# Patient Record
Sex: FEMALE | Race: WHITE | NOT HISPANIC OR LATINO | Employment: FULL TIME
[De-identification: names, ages, dates, MRNs, and addresses within clinical notes are randomized per-mention and may not be internally consistent; named-entity substitution may affect disease eponyms.]

---

## 2017-11-21 ENCOUNTER — RECORDS - HEALTHEAST (OUTPATIENT)
Dept: ADMINISTRATIVE | Facility: OTHER | Age: 52
End: 2017-11-21

## 2018-01-15 ENCOUNTER — RECORDS - HEALTHEAST (OUTPATIENT)
Dept: ADMINISTRATIVE | Facility: OTHER | Age: 53
End: 2018-01-15

## 2018-02-02 ENCOUNTER — TRANSFERRED RECORDS (OUTPATIENT)
Dept: HEALTH INFORMATION MANAGEMENT | Facility: CLINIC | Age: 53
End: 2018-02-02
Payer: COMMERCIAL

## 2018-02-08 ENCOUNTER — RECORDS - HEALTHEAST (OUTPATIENT)
Dept: BONE DENSITY | Facility: CLINIC | Age: 53
End: 2018-02-08

## 2018-02-08 ENCOUNTER — RECORDS - HEALTHEAST (OUTPATIENT)
Dept: ADMINISTRATIVE | Facility: OTHER | Age: 53
End: 2018-02-08

## 2018-02-08 DIAGNOSIS — C50.919 MALIGNANT NEOPLASM OF UNSPECIFIED SITE OF UNSPECIFIED FEMALE BREAST (H): ICD-10-CM

## 2018-02-08 DIAGNOSIS — Z79.811 LONG TERM (CURRENT) USE OF AROMATASE INHIBITORS: ICD-10-CM

## 2018-02-15 ENCOUNTER — TRANSFERRED RECORDS (OUTPATIENT)
Dept: HEALTH INFORMATION MANAGEMENT | Facility: CLINIC | Age: 53
End: 2018-02-15
Payer: COMMERCIAL

## 2018-03-06 ENCOUNTER — OFFICE VISIT - HEALTHEAST (OUTPATIENT)
Dept: FAMILY MEDICINE | Facility: CLINIC | Age: 53
End: 2018-03-06

## 2018-03-06 DIAGNOSIS — M85.80 OSTEOPENIA: ICD-10-CM

## 2018-03-06 DIAGNOSIS — Z91.030 BEE STING ALLERGY: ICD-10-CM

## 2018-03-06 DIAGNOSIS — C50.919 MALIGNANT NEOPLASM OF FEMALE BREAST (H): ICD-10-CM

## 2018-03-06 RX ORDER — LACTULOSE 10 G/15ML
SOLUTION ORAL; RECTAL
Refills: 0 | Status: SHIPPED | COMMUNITY
Start: 2018-02-09 | End: 2022-05-09

## 2018-03-06 ASSESSMENT — MIFFLIN-ST. JEOR: SCORE: 1317.77

## 2018-08-07 ENCOUNTER — COMMUNICATION - HEALTHEAST (OUTPATIENT)
Dept: FAMILY MEDICINE | Facility: CLINIC | Age: 53
End: 2018-08-07

## 2019-05-29 ENCOUNTER — COMMUNICATION - HEALTHEAST (OUTPATIENT)
Dept: FAMILY MEDICINE | Facility: CLINIC | Age: 54
End: 2019-05-29

## 2019-05-30 ENCOUNTER — AMBULATORY - HEALTHEAST (OUTPATIENT)
Dept: NURSING | Facility: CLINIC | Age: 54
End: 2019-05-30

## 2019-09-12 ENCOUNTER — OFFICE VISIT - HEALTHEAST (OUTPATIENT)
Dept: FAMILY MEDICINE | Facility: CLINIC | Age: 54
End: 2019-09-12

## 2019-09-12 DIAGNOSIS — C50.919 MALIGNANT NEOPLASM OF FEMALE BREAST, UNSPECIFIED ESTROGEN RECEPTOR STATUS, UNSPECIFIED LATERALITY, UNSPECIFIED SITE OF BREAST (H): ICD-10-CM

## 2019-09-12 DIAGNOSIS — Z23 IMMUNIZATION DUE: ICD-10-CM

## 2019-09-12 DIAGNOSIS — Z91.030 BEE STING ALLERGY: ICD-10-CM

## 2019-09-12 DIAGNOSIS — M85.80 OSTEOPENIA, UNSPECIFIED LOCATION: ICD-10-CM

## 2019-09-12 RX ORDER — BETAMETHASONE DIPROPIONATE 0.5 MG/G
LOTION TOPICAL 2 TIMES DAILY
Status: SHIPPED | COMMUNITY
Start: 2019-09-12 | End: 2022-05-09

## 2019-09-12 RX ORDER — EPINEPHRINE 0.3 MG/.3ML
0.3 INJECTION SUBCUTANEOUS
Qty: 1 | Refills: 6 | Status: SHIPPED | OUTPATIENT
Start: 2019-09-12 | End: 2023-01-26

## 2019-09-12 RX ORDER — RALOXIFENE HYDROCHLORIDE 60 MG/1
60 TABLET, FILM COATED ORAL DAILY
Status: SHIPPED | COMMUNITY
Start: 2019-09-12 | End: 2022-01-17

## 2019-09-12 RX ORDER — KETOCONAZOLE 20 MG/ML
SHAMPOO TOPICAL
Status: SHIPPED | COMMUNITY
Start: 2019-09-12 | End: 2022-05-09

## 2019-09-12 ASSESSMENT — MIFFLIN-ST. JEOR: SCORE: 1331.37

## 2019-09-23 ENCOUNTER — COMMUNICATION - HEALTHEAST (OUTPATIENT)
Dept: SCHEDULING | Facility: CLINIC | Age: 54
End: 2019-09-23

## 2019-09-25 ENCOUNTER — OFFICE VISIT - HEALTHEAST (OUTPATIENT)
Dept: FAMILY MEDICINE | Facility: CLINIC | Age: 54
End: 2019-09-25

## 2019-09-25 DIAGNOSIS — R10.31 RIGHT LOWER QUADRANT ABDOMINAL PAIN: ICD-10-CM

## 2020-01-21 ENCOUNTER — TELEPHONE (OUTPATIENT)
Dept: FAMILY MEDICINE | Facility: CLINIC | Age: 55
End: 2020-01-21

## 2020-01-21 NOTE — TELEPHONE ENCOUNTER
Reason for Call:  Other appointment    Detailed comments: Pt called requesting Dr Altman be her PCP. Advised pt Dr Altman has closed practice. Pt was referred by her Oncologist Dr Stevens. Pt states she does not want to come in for an appointment she just wants to add Dr Altman to her chart as her PCP.     Phone Number Patient can be reached at: Home number on file [phone] (home)    Best Time: Anytime     Can we leave a detailed message on this number? YES    Call taken on 1/21/2020 at 10:23 AM by Nick Ivey

## 2021-01-18 ENCOUNTER — TRANSFERRED RECORDS (OUTPATIENT)
Dept: HEALTH INFORMATION MANAGEMENT | Facility: CLINIC | Age: 56
End: 2021-01-18
Payer: COMMERCIAL

## 2021-01-18 ENCOUNTER — MEDICAL CORRESPONDENCE (OUTPATIENT)
Dept: HEALTH INFORMATION MANAGEMENT | Facility: CLINIC | Age: 56
End: 2021-01-18

## 2021-05-25 ENCOUNTER — RECORDS - HEALTHEAST (OUTPATIENT)
Dept: ADMINISTRATIVE | Facility: CLINIC | Age: 56
End: 2021-05-25

## 2021-05-26 ENCOUNTER — RECORDS - HEALTHEAST (OUTPATIENT)
Dept: ADMINISTRATIVE | Facility: CLINIC | Age: 56
End: 2021-05-26

## 2021-05-28 ENCOUNTER — RECORDS - HEALTHEAST (OUTPATIENT)
Dept: ADMINISTRATIVE | Facility: CLINIC | Age: 56
End: 2021-05-28

## 2021-05-29 NOTE — TELEPHONE ENCOUNTER
New Appointment Needed  What is the reason for the visit:    1 of 2 shingrix vaccine  Provider Preference: Any available  How soon do you need to be seen?: when available  Waitlist offered?: No  Okay to leave a detailed message:  Yes

## 2021-05-30 ENCOUNTER — RECORDS - HEALTHEAST (OUTPATIENT)
Dept: ADMINISTRATIVE | Facility: CLINIC | Age: 56
End: 2021-05-30

## 2021-05-31 VITALS — WEIGHT: 155 LBS | HEIGHT: 67 IN | BODY MASS INDEX: 24.33 KG/M2

## 2021-06-01 NOTE — TELEPHONE ENCOUNTER
New Appointment Needed  What is the reason for the visit:    Inpatient/ED Follow Up Appt Request  At what hospital or facility were you seen?: N/A   What is the reason you were seen?: Possible appendicitis    What date were you admitted?: date: 9/22/19  What date were you discharged?:   9/22/19  What was the recommended timeframe for your follow up appointment?: 2 days   Provider Preference: PCP only  How soon do you need to be seen?: tomorrow  Waitlist offered?: No  Okay to leave a detailed message:  Yes    Please call patient as soon as able.

## 2021-06-01 NOTE — TELEPHONE ENCOUNTER
Left detailed message for pt relaying MD message below.  Please assist pt with scheduling an EDF appt on Wednesday with Dr. Salgado (as Dr. Ryan is out of the office on Wednesdays).

## 2021-06-01 NOTE — PROGRESS NOTES
Chief Complaint   Patient presents with     Follow-up     Appendisitis         HPI:   Rosalinda Garcia is a 53 y.o. female in for follow up of ER visit three days ago.  Was seen for abdominal pain.  Pain has significantly improved. No fever.  No nausea. No vomiting. No diarrhea.  No blood in stools.  No dysuria.  No hematuria.  No medication taken.    ROS:  A 10 point comprehensive review of systems was negative except as noted.      Medications:  Current Outpatient Medications on File Prior to Visit   Medication Sig Dispense Refill     betamethasone dipropionate 0.05 % lotion Apply topically 2 (two) times a day.       CALCIUM CARBONATE (CALCIUM 500 ORAL) Take by mouth.       cholecalciferol, vitamin D3, (VITAMIN D3) 1,000 unit capsule Take 1,000 Units by mouth daily.       clobetasol (TEMOVATE) 0.05 % external solution APPLY TO SCALP LESIONS ONE TO TWO TIMES D PRN  2     EPINEPHrine (EPIPEN) 0.3 mg/0.3 mL injection Inject 0.3 mL (0.3 mg total) into the shoulder, thigh, or buttocks once as needed (anaphylaxis). 1 Pre-filled Pen Syringe 6     GENERLAC 10 gram/15 mL solution TK 15 ML PO D PRN  0     ketoconazole (NIZORAL) 2 % shampoo Apply topically 2 (two) times a week. Apply to damp skin, lather, leave on 5 minutes, and rinse       ondansetron (ZOFRAN) 4 MG tablet Take 1-2 tab po 4 times daily prn nausea, max daily dose 4 tab 20 tablet 0     raloxifene (EVISTA) 60 mg tablet Take 60 mg by mouth daily.       No current facility-administered medications on file prior to visit.          Social History:  Social History     Tobacco Use     Smoking status: Never Smoker     Smokeless tobacco: Never Used   Substance Use Topics     Alcohol use: Not on file         Physical Exam:   Vitals:    09/25/19 1506   BP: 100/70   Pulse: 64   Resp: 12   Temp: 98  F (36.7  C)   TempSrc: Oral   Weight: 158 lb (71.7 kg)     GENERAL:   Alert. Oriented.  EYES: Clear  HENT:  Ears: R TM pearly gray. Normal landmarks. L TM pearly gray.   Normal landmarks  Nose: Clear.  Sinuses: Nontender.  Oropharynx:  No erythema. No exudate.  NECK: Supple. No adenopathy.  LUNGS: Clear to ascultation.  No crackles.  No wheezing  HEART: RRR  SKIN:  No rash.      CHART REVIEW  DATE/place of visit: 9/22/2019, [facility]  DX: abnormal computed tomography of ureter, nausea  TESTS: CT abdomen--mild prominence of collecting system of right kidney and right ureter, no stone, normal appendix. Moderate stool in colon  TREATMENT: zofran      Assessment/Plan:    1. Right lower quadrant abdominal pain        Resolved at this time.  Did have mild abnormality of right ureter but no stone on CT.  Discussed she may have passed a stone, but no stones noted now, no further treatment needed.  Did have moderate amount of stool in colon and this may have caused pain.  Recommended starting on metamucil daily.  May continue the miralax as needed.    Advised continuing to observe. If abdominal pain recurs, needs reevaluation.          Zaira Hinds MD      9/25/2019    The following portions of the patient's history were reviewed and updated as appropriate: allergies, current medications, past family history, past medical history, past social history, past surgical history and problem list.

## 2021-06-01 NOTE — TELEPHONE ENCOUNTER
See message below.  Ok to wait to be seen until Wednesday with Dr. Salgado?  No openings with either you or Dr. Salgado tomorrow.

## 2021-06-01 NOTE — TELEPHONE ENCOUNTER
I reviewed her CT from the ER and there was not any appendicitis seen.  There was possibly a kidney stone that might have passed.  I think she can wait until Wednesday for follow up.  If having severe pain, fever, vomiting, then should be re-eval at the ED.

## 2021-06-01 NOTE — PROGRESS NOTES
Assessment/ Plan     1. Malignant neoplasm of female breast, unspecified estrogen receptor status, unspecified laterality, unspecified site of breast (H)  Patient had bilateral mastectomy 17 years ago.  She follows with oncology.  They wanted her to start Evista this last spring.  We discussed this is a good idea as she has some osteopenia and might be helpful.  She has gained a little weight with it so has some concerns.  She is really good about diet and exercise.  Urged her to stay on it for now.    2. Bee sting allergy  Patient needs a refill of her EpiPen.  - EPINEPHrine (EPIPEN) 0.3 mg/0.3 mL injection; Inject 0.3 mL (0.3 mg total) into the shoulder, thigh, or buttocks once as needed (anaphylaxis).  Dispense: 1 Pre-filled Pen Syringe; Refill: 6    3. Immunization due  Shingles vaccine #2 was given today.  She will get her flu shot at work.    4. Osteopenia, unspecified location  Patient will be due for another bone density next spring.  She is now taking Evista.  She is good about calcium, vitamin D, and exercise.      Subjective:       Rosalinda Garcia is a 53 y.o. female who presents for several issues.  The first of which is a refill on her EpiPen.  She has an allergy to bees.  She has a history of breast cancer and had a mastectomy about 17 years ago.  She follows with [facility] and they recommended that she start EVISta as she only did 5 years of tamoxifen originally.  She thinks she is gained about 6 pounds since the spring.  She has been exercising regularly and watching what she is eating.  She is little bit frustrated by this.  She did have some osteopenia on her last bone density, likely from having a complete hysterectomy and decrease in estrogen.  As she is otherwise so healthy, I strongly recommend that she try to stay on the Evista for bone health.  She had some other minor questions about her scalp and some shampoo and steroid that her dermatologist prescribed.  She did get her first Shingrix  "vaccine and felt ill for a couple of days.  She is wondering if she should get the second booster.  I did recommend that she get that today.    Relevant past medical, family, surgical, and social history reviewed with patient, unless noted in HPI, not pertinent for this visit.  Medications were discussed and reconciled.   Review of Systems   A 12 point comprehensive review of systems was negative except as noted.      Current Outpatient Medications   Medication Sig Dispense Refill     betamethasone dipropionate 0.05 % lotion Apply topically 2 (two) times a day.       CALCIUM CARBONATE (CALCIUM 500 ORAL) Take by mouth.       cholecalciferol, vitamin D3, (VITAMIN D3) 1,000 unit capsule Take 1,000 Units by mouth daily.       EPINEPHrine (EPIPEN) 0.3 mg/0.3 mL injection Inject 0.3 mL (0.3 mg total) into the shoulder, thigh, or buttocks once as needed (anaphylaxis). 1 Pre-filled Pen Syringe 6     GENERLAC 10 gram/15 mL solution TK 15 ML PO D PRN  0     ketoconazole (NIZORAL) 2 % shampoo Apply topically 2 (two) times a week. Apply to damp skin, lather, leave on 5 minutes, and rinse       raloxifene (EVISTA) 60 mg tablet Take 60 mg by mouth daily.       clobetasol (TEMOVATE) 0.05 % external solution APPLY TO SCALP LESIONS ONE TO TWO TIMES D PRN  2     No current facility-administered medications for this visit.        Objective:      /58   Pulse 66   Temp 97.8  F (36.6  C) (Oral)   Resp 12   Ht 5' 6.5\" (1.689 m)   Wt 158 lb (71.7 kg)   BMI 25.12 kg/m        General appearance: alert, appears stated age and cooperative      No results found for this or any previous visit (from the past 168 hour(s)).       This note has been dictated using voice recognition software. Any grammatical or context distortions are unintentional and inherent to the software  "

## 2021-06-03 VITALS
HEART RATE: 66 BPM | TEMPERATURE: 97.8 F | HEIGHT: 67 IN | DIASTOLIC BLOOD PRESSURE: 58 MMHG | WEIGHT: 158 LBS | RESPIRATION RATE: 12 BRPM | BODY MASS INDEX: 24.8 KG/M2 | SYSTOLIC BLOOD PRESSURE: 102 MMHG

## 2021-06-03 VITALS
HEART RATE: 64 BPM | WEIGHT: 158 LBS | RESPIRATION RATE: 12 BRPM | DIASTOLIC BLOOD PRESSURE: 70 MMHG | BODY MASS INDEX: 25.12 KG/M2 | SYSTOLIC BLOOD PRESSURE: 100 MMHG | TEMPERATURE: 98 F

## 2021-06-15 ENCOUNTER — TRANSCRIBE ORDERS (OUTPATIENT)
Dept: OTHER | Age: 56
End: 2021-06-15

## 2021-06-15 DIAGNOSIS — Z85.3 PERSONAL HISTORY OF MALIGNANT NEOPLASM OF BREAST: Primary | ICD-10-CM

## 2021-06-16 NOTE — PROGRESS NOTES
Assessment/ Plan     1. Bee sting allergy  Patient needs a new epinephrine pen as hers  about 4 years ago.  I also show she is due for tetanus booster so that was given today.  - EPINEPHrine (EPIPEN) 0.3 mg/0.3 mL injection; Inject 0.3 mL (0.3 mg total) into the shoulder, thigh, or buttocks once as needed (anaphylaxis).  Dispense: 1 Pre-filled Pen Syringe; Refill: 6  - Tdap vaccine greater than or equal to 8yo IM    2. Malignant neoplasm of female breast  Patient follows regularly with oncology.  She had breast cancer at age 36.  She recently had genetic testing which was negative.  She has had a bilateral mastectomy and complete hysterectomy.    3. Osteopenia  I reviewed patient's most recent bone density.  She did lose about 20% of her bone mass after her hysterectomy.  We discussed the estrogen sensitive bone mass and that is likely why it declined so much.  Would not recommend starting medications at this point.  We did discuss getting adequate calcium, vitamin D, and weightbearing exercise.  Would recheck in 2 years.      Subjective:       Rosalinda Garcia is a 52 y.o. female who presents for follow-up.  I have not seen this patient now in 5 years.  She was diagnosed with breast cancer at age 36.  She had bilateral mastectomy and complete hysterectomy.  She follows with oncology on a regular basis and has yearly blood work.  She sees a gynecologist for her breast exams.  She just wanted to touch base and see if there is anything else she should be doing.  We reviewed her most recent bone density which show some osteopenia but no fracture risk.  We discussed getting adequate calcium, vitamin D, and weightbearing exercise.  Would not start medication at this point.  She does have a strong family history of osteoporosis.  We discussed the lack of estrogen after her hysterectomy and treatment for breast cancer is a likely culprit.  She does need a new EpiPen as hers  about 4 years ago.  She uses this  "for bee sting allergy.  Review the chart shows that she is due for tetanus.  She is up-to-date on colonoscopy.    Relevant past medical, family, surgical, and social history reviewed with patient, unless noted in HPI, not pertinent for this visit.    Review of Systems   A 12 point comprehensive review of systems was negative except as noted.      Current Outpatient Prescriptions   Medication Sig Dispense Refill     CALCIUM CARBONATE (CALCIUM 500 ORAL) Take by mouth.       cholecalciferol, vitamin D3, (VITAMIN D3) 1,000 unit capsule Take 1,000 Units by mouth daily.       clobetasol (TEMOVATE) 0.05 % external solution APPLY TO SCALP LESIONS ONE TO TWO TIMES D PRN  2     EPINEPHrine (EPIPEN) 0.3 mg/0.3 mL injection Inject 0.3 mL (0.3 mg total) into the shoulder, thigh, or buttocks once as needed (anaphylaxis). 1 Pre-filled Pen Syringe 6     GENERLAC 10 gram/15 mL solution TK 15 ML PO D PRN  0     No current facility-administered medications for this visit.        Objective:      /60  Pulse 64  Temp 97.9  F (36.6  C) (Oral)   Resp 16  Ht 5' 6.5\" (1.689 m)  Wt 155 lb (70.3 kg)  BMI 24.64 kg/m2      General appearance: alert, appears stated age and cooperative    Lungs: clear to auscultation bilaterally  Heart: regular rate and rhythm, S1, S2 normal, no murmur, click, rub or gallop      No results found for this or any previous visit (from the past 168 hour(s)).       This note has been dictated using voice recognition software. Any grammatical or context distortions are unintentional and inherent to the software  "

## 2021-06-24 ENCOUNTER — PRE VISIT (OUTPATIENT)
Dept: OTHER | Age: 56
End: 2021-06-24

## 2021-06-24 NOTE — TELEPHONE ENCOUNTER
Action 06/24/2021   Action Taken REQ SENT TO [facility] PER PT SHE WILL SIGNED DARCY.    CK

## 2021-10-21 NOTE — PROGRESS NOTES
RECORDS STATUS - ALL OTHER DIAGNOSIS      RECORDS RECEIVED FROM: [facility]/ [facility] / [facility]   DATE RECEIVED: 1/17/2022   NOTES STATUS DETAILS   OFFICE NOTE from referring provider     OFFICE NOTE from medical oncologist Complete Pt saw Dr. Stevens at [facility]     [facility] Records are in EPIC   DISCHARGE SUMMARY from hospital     DISCHARGE REPORT from the ER     OPERATIVE REPORT     MEDICATION LIST Complete [facility]   CLINICAL TRIAL TREATMENTS TO DATE     LABS     PATHOLOGY REPORTS Complete- BX Report- [facility]  Jan 18th 2002 [facility] Dr. Narayan    Pt actually had her biopsy done on Jan 23rd 2002   ANYTHING RELATED TO DIAGNOSIS     GENONOMIC TESTING     TYPE:     IMAGING (NEED IMAGES & REPORT)     CT SCANS     MRI     MAMMO Complete Historical Path Report in Epic/CE   Dx Hip/ Pelvis/Spine  Complete  11/2/2021   ULTRASOUND     PET       Action    Action Taken 10/21/2021 3:09PM KEB   I called pt Rosalinda - her phone went to .     12/8/2021 9:44AM KEB   I called pt Rosalinda- her phone went to . I left a      1/5/2021 3:31pm KEB   I called [facility] [phone]  - they need an DARCY.     I called pt Rosalinda - her phone went to .     1/13/2022 11AM KEB   I called pt Rosalinda again - her phone went to  twice. I left her a  requesting a call back. She doesn't have an email address on file- I can't send her a written message.     I faxed the DARCY form to her work fax: [phone]- She has been going to [facility] for the last 20 years.     She will get the DARCY form back to me within the next few hours (before 2pm)     Tristin Sy doesn't have many breast images to collect.     I called [facility]'s Medical Records Dept [phone]  - I explained that we need to request the pt's paper chart now and that I'll be sending the DARCY form over soon.     1:33pm EMIL   Kerrie sent back her DARCY form for [facility].     I faxed the DARCY and request form over to [facility]    1/14/2022 7:23AM KEB   I faxed [facility] records to [facility]    1/14/2022 11:11AM EMIL   I faxed [facility] records to [facility] again.     1/14/2022 11:15AM EMIL   I called [facility] Path Dept     [facility] (Phone): [phone] (Fax): [phone]- they are going to work on tracking the pt's bx report. They weren't able to find the report- it only goes back to 2013.     I called [facility] Medical Records Dept to see if they have any reports from the early 2000s.     I called pt Rosalinda - she actually had her breast biopsy done at the [facility] back on Jan 18th 2002 by Dr. Melgar     I called [facility]'s historical records dept - Ph: [phone] #[phone], #[phone]  -they took down my info and records request.     1/14/2022 12:11PM EMIL Morales from [facility] called me back- she was able to locate some records from Jan 18th 2002 but the pt actually had her biopsy done on Jan 23 2002.     I received the historical path reports and faxed them off to HIM

## 2021-11-02 ENCOUNTER — ANCILLARY PROCEDURE (OUTPATIENT)
Dept: BONE DENSITY | Facility: CLINIC | Age: 56
End: 2021-11-02
Attending: INTERNAL MEDICINE
Payer: COMMERCIAL

## 2021-11-02 DIAGNOSIS — C50.911 MALIGNANT NEOPLASM OF RIGHT BREAST (H): ICD-10-CM

## 2021-11-02 PROCEDURE — 77080 DXA BONE DENSITY AXIAL: CPT | Mod: TC | Performed by: RADIOLOGY

## 2022-01-17 ENCOUNTER — ONCOLOGY VISIT (OUTPATIENT)
Dept: ONCOLOGY | Facility: CLINIC | Age: 57
End: 2022-01-17
Attending: INTERNAL MEDICINE
Payer: COMMERCIAL

## 2022-01-17 ENCOUNTER — PRE VISIT (OUTPATIENT)
Dept: ONCOLOGY | Facility: CLINIC | Age: 57
End: 2022-01-17

## 2022-01-17 VITALS
HEIGHT: 66 IN | OXYGEN SATURATION: 99 % | TEMPERATURE: 98.3 F | RESPIRATION RATE: 16 BRPM | HEART RATE: 74 BPM | BODY MASS INDEX: 23.5 KG/M2 | DIASTOLIC BLOOD PRESSURE: 67 MMHG | SYSTOLIC BLOOD PRESSURE: 101 MMHG | WEIGHT: 146.2 LBS

## 2022-01-17 DIAGNOSIS — Z91.030 BEE STING ALLERGY: ICD-10-CM

## 2022-01-17 DIAGNOSIS — Z85.3 PERSONAL HISTORY OF MALIGNANT NEOPLASM OF BREAST: Primary | ICD-10-CM

## 2022-01-17 LAB
ALBUMIN SERPL-MCNC: 4 G/DL (ref 3.4–5)
ALP SERPL-CCNC: 61 U/L (ref 40–150)
ALT SERPL W P-5'-P-CCNC: 23 U/L (ref 0–50)
ANION GAP SERPL CALCULATED.3IONS-SCNC: 3 MMOL/L (ref 3–14)
AST SERPL W P-5'-P-CCNC: 15 U/L (ref 0–45)
BASOPHILS # BLD AUTO: 0 10E3/UL (ref 0–0.2)
BASOPHILS NFR BLD AUTO: 1 %
BILIRUB SERPL-MCNC: 0.3 MG/DL (ref 0.2–1.3)
BUN SERPL-MCNC: 18 MG/DL (ref 7–30)
CALCIUM SERPL-MCNC: 9.5 MG/DL (ref 8.5–10.1)
CANCER AG27-29 SERPL-ACNC: 18 U/ML (ref 0–39)
CEA SERPL-MCNC: <0.5 UG/L (ref 0–2.5)
CHLORIDE BLD-SCNC: 104 MMOL/L (ref 94–109)
CO2 SERPL-SCNC: 30 MMOL/L (ref 20–32)
CREAT SERPL-MCNC: 0.73 MG/DL (ref 0.52–1.04)
EOSINOPHIL # BLD AUTO: 0.1 10E3/UL (ref 0–0.7)
EOSINOPHIL NFR BLD AUTO: 2 %
ERYTHROCYTE [DISTWIDTH] IN BLOOD BY AUTOMATED COUNT: 13.2 % (ref 10–15)
GFR SERPL CREATININE-BSD FRML MDRD: >90 ML/MIN/1.73M2
GLUCOSE BLD-MCNC: 119 MG/DL (ref 70–99)
HCT VFR BLD AUTO: 41.5 % (ref 35–47)
HGB BLD-MCNC: 13.5 G/DL (ref 11.7–15.7)
IMM GRANULOCYTES # BLD: 0 10E3/UL
IMM GRANULOCYTES NFR BLD: 0 %
LYMPHOCYTES # BLD AUTO: 2.2 10E3/UL (ref 0.8–5.3)
LYMPHOCYTES NFR BLD AUTO: 32 %
MCH RBC QN AUTO: 30.8 PG (ref 26.5–33)
MCHC RBC AUTO-ENTMCNC: 32.5 G/DL (ref 31.5–36.5)
MCV RBC AUTO: 95 FL (ref 78–100)
MONOCYTES # BLD AUTO: 0.3 10E3/UL (ref 0–1.3)
MONOCYTES NFR BLD AUTO: 4 %
NEUTROPHILS # BLD AUTO: 4.2 10E3/UL (ref 1.6–8.3)
NEUTROPHILS NFR BLD AUTO: 61 %
NRBC # BLD AUTO: 0 10E3/UL
NRBC BLD AUTO-RTO: 0 /100
PLATELET # BLD AUTO: 225 10E3/UL (ref 150–450)
POTASSIUM BLD-SCNC: 3.6 MMOL/L (ref 3.4–5.3)
PROT SERPL-MCNC: 7.7 G/DL (ref 6.8–8.8)
RBC # BLD AUTO: 4.39 10E6/UL (ref 3.8–5.2)
SODIUM SERPL-SCNC: 137 MMOL/L (ref 133–144)
WBC # BLD AUTO: 6.8 10E3/UL (ref 4–11)

## 2022-01-17 PROCEDURE — 86300 IMMUNOASSAY TUMOR CA 15-3: CPT | Performed by: INTERNAL MEDICINE

## 2022-01-17 PROCEDURE — 99205 OFFICE O/P NEW HI 60 MIN: CPT | Performed by: INTERNAL MEDICINE

## 2022-01-17 PROCEDURE — G0463 HOSPITAL OUTPT CLINIC VISIT: HCPCS

## 2022-01-17 PROCEDURE — 80053 COMPREHEN METABOLIC PANEL: CPT | Performed by: INTERNAL MEDICINE

## 2022-01-17 PROCEDURE — 36415 COLL VENOUS BLD VENIPUNCTURE: CPT | Performed by: INTERNAL MEDICINE

## 2022-01-17 PROCEDURE — 82306 VITAMIN D 25 HYDROXY: CPT | Performed by: INTERNAL MEDICINE

## 2022-01-17 PROCEDURE — 82378 CARCINOEMBRYONIC ANTIGEN: CPT | Performed by: INTERNAL MEDICINE

## 2022-01-17 PROCEDURE — 85025 COMPLETE CBC W/AUTO DIFF WBC: CPT | Performed by: INTERNAL MEDICINE

## 2022-01-17 RX ORDER — METAXALONE 800 MG/1
800 TABLET ORAL 2 TIMES DAILY
Qty: 60 TABLET | Refills: 1 | Status: SHIPPED | OUTPATIENT
Start: 2022-01-17 | End: 2022-05-09

## 2022-01-17 RX ORDER — LACTULOSE 10 G/15ML
SOLUTION ORAL; RECTAL
Refills: 0 | Status: CANCELLED | OUTPATIENT
Start: 2022-01-17

## 2022-01-17 RX ORDER — LACTULOSE 10 G/15ML
20 SOLUTION ORAL 3 TIMES DAILY
Qty: 480 ML | Refills: 1 | Status: SHIPPED | OUTPATIENT
Start: 2022-01-17 | End: 2022-05-09

## 2022-01-17 RX ORDER — PREDNISONE 10 MG/1
10 TABLET ORAL DAILY
Qty: 5 TABLET | Refills: 0 | Status: SHIPPED | OUTPATIENT
Start: 2022-01-17 | End: 2022-05-09

## 2022-01-17 ASSESSMENT — MIFFLIN-ST. JEOR: SCORE: 1264.91

## 2022-01-17 ASSESSMENT — PAIN SCALES - GENERAL: PAINLEVEL: NO PAIN (0)

## 2022-01-17 NOTE — PROGRESS NOTES
"Oncology Rooming Note    January 17, 2022 2:50 PM   Rosalinda Garcia is a 56 year old female who presents for:    Chief Complaint   Patient presents with     Oncology Clinic Visit     New Patient     Initial Vitals: /67   Pulse 74   Temp 98.3  F (36.8  C) (Oral)   Resp 16   Ht 1.676 m (5' 6\")   Wt 66.3 kg (146 lb 3.2 oz)   SpO2 99%   BMI 23.60 kg/m   Estimated body mass index is 23.6 kg/m  as calculated from the following:    Height as of this encounter: 1.676 m (5' 6\").    Weight as of this encounter: 66.3 kg (146 lb 3.2 oz). Body surface area is 1.76 meters squared.  No Pain (0) Comment: Data Unavailable   No LMP recorded.  Allergies reviewed: Yes  Medications reviewed: Yes    Medications: MEDICATION REFILLS NEEDED TODAY. Provider was notified.  Pharmacy name entered into [employer]: [id number] - [place], [place] - [facility] [address] AT [facility] [address]    Clinical concerns: Refills needed for Epi-Pen and Generlac.       Joseline Yang MA              "

## 2022-01-17 NOTE — LETTER
"    1/17/2022         RE: Rosalinda Garcia  [address] [facility] [address]        Dear Colleague,    Thank you for referring your patient, Rosalinda Garcia, to the [facility]. Please see a copy of my visit note below.    Oncology Rooming Note    January 17, 2022 2:50 PM   Rosalinda Garcia is a 56 year old female who presents for:    Chief Complaint   Patient presents with     Oncology Clinic Visit     New Patient     Initial Vitals: /67   Pulse 74   Temp 98.3  F (36.8  C) (Oral)   Resp 16   Ht 1.676 m (5' 6\")   Wt 66.3 kg (146 lb 3.2 oz)   SpO2 99%   BMI 23.60 kg/m   Estimated body mass index is 23.6 kg/m  as calculated from the following:    Height as of this encounter: 1.676 m (5' 6\").    Weight as of this encounter: 66.3 kg (146 lb 3.2 oz). Body surface area is 1.76 meters squared.  No Pain (0) Comment: Data Unavailable   No LMP recorded.  Allergies reviewed: Yes  Medications reviewed: Yes    Medications: MEDICATION REFILLS NEEDED TODAY. Provider was notified.  Pharmacy name entered into [facility]: [facility] #[id number] - [address] [place] AT [facility] [address]    Clinical concerns: Refills needed for Epi-Pen and Generlac.       Joseline Yang MA                [facility]    Hematology/Oncology New Patient Note      Today's Date: 01/17/22    Reason for Consult: breast cancer reestablish care        HISTORY OF PRESENT ILLNESS:     Oncology treatment Summary:    1.  February 2002 right breast mastectomy T1 cN0 M0 infiltrating ductal carcinoma ER/CT positive  2.  Left prophylactic mastectomy negative  3.  Adriamycin Cytoxan completed in May 2002  4.  Tamoxifen for 5 years completing in May 2007  5.  Status post CIRO/BSO    Interval history: Rosalinda returns today for follow-up.  She is clinically doing well.  Bone density scan was reviewed which shows mild osteopenia with a T score of -2.6.          REVIEW OF " SYSTEMS:   14 point ROS was reviewed and is negative other than as noted above in HPI.       HOME MEDICATIONS:  Current Outpatient Medications   Medication Sig Dispense Refill     CALCIUM CARBONATE (CALCIUM 500 ORAL) [CALCIUM CARBONATE (CALCIUM 500 ORAL)] Take by mouth.       cholecalciferol, vitamin D3, (VITAMIN D3) 1,000 unit capsule [CHOLECALCIFEROL, VITAMIN D3, (VITAMIN D3) 1,000 UNIT CAPSULE] Take 1,000 Units by mouth daily.       EPINEPHrine (EPIPEN) 0.3 mg/0.3 mL injection [EPINEPHRINE (EPIPEN) 0.3 MG/0.3 ML INJECTION] Inject 0.3 mL (0.3 mg total) into the shoulder, thigh, or buttocks once as needed (anaphylaxis). 1 6     GENERLAC 10 gram/15 mL solution [GENERLAC 10 GRAM/15 ML SOLUTION] TK 15 ML PO D PRN  0     lactulose (CHRONULAC) 10 GM/15ML solution Take 30 mLs (20 g) by mouth 3 times daily 480 mL 1     metaxalone (SKELAXIN) 800 MG tablet Take 1 tablet (800 mg) by mouth 2 times daily 60 tablet 1     predniSONE (DELTASONE) 10 MG tablet Take 1 tablet (10 mg) by mouth daily 5 tablet 0     betamethasone dipropionate 0.05 % lotion [BETAMETHASONE DIPROPIONATE 0.05 % LOTION] Apply topically 2 (two) times a day.       clobetasol (TEMOVATE) 0.05 % external solution [CLOBETASOL (TEMOVATE) 0.05 % EXTERNAL SOLUTION] APPLY TO SCALP LESIONS ONE TO TWO TIMES D PRN  2     ketoconazole (NIZORAL) 2 % shampoo [KETOCONAZOLE (NIZORAL) 2 % SHAMPOO] Apply topically 2 (two) times a week. Apply to damp skin, lather, leave on 5 minutes, and rinse       ondansetron (ZOFRAN) 4 MG tablet [ONDANSETRON (ZOFRAN) 4 MG TABLET] Take 1-2 tab po 4 times daily prn nausea, max daily dose 4 tab (Patient not taking: Reported on 1/17/2022) 20 tablet 0         ALLERGIES:  Allergies   Allergen Reactions     Venom-Honey Bee [Bee Venom] Anaphylaxis     Benadryl [Diphenhydramine] Other (See Comments)     IV, painful at site of IV.      Contrast [Iohexol] Unknown         PAST MEDICAL HISTORY:  Past Medical History:   Diagnosis Date     Breast cancer (H)  "         PAST SURGICAL HISTORY:  Past Surgical History:   Procedure Laterality Date     LAPAROSCOPIC HYSTERECTOMY TOTAL      for precancerous cells uterine     MASTECTOMY Bilateral      OTHER SURGICAL HISTORY Bilateral 2002    mastectomy         SOCIAL HISTORY:  Social History     Socioeconomic History     Marital status:      Spouse name: Not on file     Number of children: Not on file     Years of education: Not on file     Highest education level: Not on file   Occupational History     Not on file   Tobacco Use     Smoking status: Never Smoker     Smokeless tobacco: Never Used   Substance and Sexual Activity     Alcohol use: Not on file     Drug use: Never     Sexual activity: Not on file   Other Topics Concern     Not on file   Social History Narrative     Not on file     Social Determinants of Health     Financial Resource Strain: Not on file   Food Insecurity: Not on file   Transportation Needs: Not on file   Physical Activity: Not on file   Stress: Not on file   Social Connections: Not on file   Intimate Partner Violence: Not on file   Housing Stability: Not on file         FAMILY HISTORY:  No change in family history last testing was in 2016 and negative    PHYSICAL EXAM:  Vital signs:  /67   Pulse 74   Temp 98.3  F (36.8  C) (Oral)   Resp 16   Ht 1.676 m (5' 6\")   Wt 66.3 kg (146 lb 3.2 oz)   SpO2 99%   BMI 23.60 kg/m     ECO  GENERAL/CONSTITUTIONAL: No acute distress.  EYES: Pupils are equal, round, and react to light and accommodation. Extraocular movements intact.  No scleral icterus.  ENT/MOUTH: Neck supple. Oropharynx clear, no mucositis.  LYMPH: No anterior cervical, posterior cervical, supraclavicular, axillary or inguinal adenopathy.   RESPIRATORY: Clear to auscultation bilaterally. No crackles or wheezing.   CARDIOVASCULAR: Regular rate and rhythm without murmurs, gallops, or rubs.  GASTROINTESTINAL: No hepatosplenomegaly, masses, or tenderness. The patient has normal bowel " sounds. No guarding.  No distention.  MUSCULOSKELETAL: Warm and well-perfused, no cyanosis, clubbing, or edema.  NEUROLOGIC: Cranial nerves II-XII are intact. Alert, oriented, answers questions appropriately.  INTEGUMENTARY: No rashes or jaundice.  GAIT: Steady, does not use assistive device  Status post bilateral mastectomies with no evidence of recurrence on the chest wall.  No axillary adenopathy    LABS:  CBC RESULTS:   Recent Labs   Lab Test 01/17/22  1550   WBC 6.8   RBC 4.39   HGB 13.5   HCT 41.5   MCV 95   MCH 30.8   MCHC 32.5   RDW 13.2          Recent Labs   Lab Test 01/17/22  1550 09/22/19  1315    140  141   POTASSIUM 3.6 3.9  3.9   CHLORIDE 104 106  106   CO2 30 26  27   ANIONGAP 3 8  8   * 108  108   BUN 18 13  13   CR 0.73 0.77  0.78   ALBINO 9.5 9.9  9.8         PATHOLOGY:  As per HPI    IMAGING:  None    ASSESSMENT/PLAN:  Rosalinda is a very pleasant 56 year old female with history of breast cancer almost 20 years ago    Discussed with Kerrie that she can be discharged from the clinic but she prefers to come in yearly to get her labs drawn and visit. She is CC of some back strain after heavy lifting and I will give her a muscle relaxant and I have asked her not to lift anything heavy for 4 weeks. Alternatively she can do prednisone for 5 days, if the muscle relaxant is too expensive    Discussed BMD in detail she has mild osteoperosis,, do Algae Albino and flax seed 2 tbsp a day . Repeat bmd in one year     1 breast cancer: cured. No need for further follow up but patient prefers it so will continue yearly  2 DEXA in November 2021 showed mild osteopenia. Stop current d and do algae albino and flax see  3 muscle strain : see above, prednisone or skelaxin for a short duration        TT spent 80  min, 45 min with the patient reviewing findings above, 15 min reviewing previous records, 10  min electronically documenting clinical information from today's visit and 10 entering  orders  Ildefonso Stevens MD  Hematology/Oncology  [facility]      Again, thank you for allowing me to participate in the care of your patient.        Sincerely,        Ildefonso Stevens MD

## 2022-01-18 ENCOUNTER — TELEPHONE (OUTPATIENT)
Dept: ONCOLOGY | Facility: CLINIC | Age: 57
End: 2022-01-18
Payer: COMMERCIAL

## 2022-01-18 LAB — DEPRECATED CALCIDIOL+CALCIFEROL SERPL-MC: 66 UG/L (ref 20–75)

## 2022-01-18 NOTE — TELEPHONE ENCOUNTER
Patient is calling stating she was only able to find AlgaeCal Plus. She is wondering if it's ok for her to take the Plus version. She also noticed this already has vit D in it, will she need to discontinue her current Vit D?    Routing to provider to review and advise.    Mahi Gautam RN, BSN, PHN  M.[facility]

## 2022-01-19 ENCOUNTER — DOCUMENTATION ONLY (OUTPATIENT)
Dept: ONCOLOGY | Facility: CLINIC | Age: 57
End: 2022-01-19

## 2022-01-19 NOTE — PROGRESS NOTES
Action    Action Taken 1/19/2022 11:57AM EMIL     I called [facility] to request pt Rosalinda genetic records. [phone]  - I spoke to Caryl in medical records- she is looking at pt Rosalinda's chart right now. I see we have some [facility] genetic reports forms from 2018 already in Epic.     Caryl said they did fax some genetic reports to a different fax number. I asked her to also fax the reports on our oncology fax line.     1/19/2022 1:55pM EMIL   I faxed the genetic records from [facility] to HIM and emailed them to Charlotte

## 2022-01-19 NOTE — PROGRESS NOTES
[facility]    Hematology/Oncology New Patient Note      Today's Date: 01/17/22    Reason for Consult: breast cancer reestablish care        HISTORY OF PRESENT ILLNESS:     Oncology treatment Summary:    1.  February 2002 right breast mastectomy T1 cN0 M0 infiltrating ductal carcinoma ER/NC positive  2.  Left prophylactic mastectomy negative  3.  Adriamycin Cytoxan completed in May 2002  4.  Tamoxifen for 5 years completing in May 2007  5.  Status post CIRO/BSO    Interval history: Rosalinda returns today for follow-up.  She is clinically doing well.  Bone density scan was reviewed which shows mild osteopenia with a T score of -2.6.          REVIEW OF SYSTEMS:   14 point ROS was reviewed and is negative other than as noted above in HPI.       HOME MEDICATIONS:  Current Outpatient Medications   Medication Sig Dispense Refill     CALCIUM CARBONATE (CALCIUM 500 ORAL) [CALCIUM CARBONATE (CALCIUM 500 ORAL)] Take by mouth.       cholecalciferol, vitamin D3, (VITAMIN D3) 1,000 unit capsule [CHOLECALCIFEROL, VITAMIN D3, (VITAMIN D3) 1,000 UNIT CAPSULE] Take 1,000 Units by mouth daily.       EPINEPHrine (EPIPEN) 0.3 mg/0.3 mL injection [EPINEPHRINE (EPIPEN) 0.3 MG/0.3 ML INJECTION] Inject 0.3 mL (0.3 mg total) into the shoulder, thigh, or buttocks once as needed (anaphylaxis). 1 6     GENERLAC 10 gram/15 mL solution [GENERLAC 10 GRAM/15 ML SOLUTION] TK 15 ML PO D PRN  0     lactulose (CHRONULAC) 10 GM/15ML solution Take 30 mLs (20 g) by mouth 3 times daily 480 mL 1     metaxalone (SKELAXIN) 800 MG tablet Take 1 tablet (800 mg) by mouth 2 times daily 60 tablet 1     predniSONE (DELTASONE) 10 MG tablet Take 1 tablet (10 mg) by mouth daily 5 tablet 0     betamethasone dipropionate 0.05 % lotion [BETAMETHASONE DIPROPIONATE 0.05 % LOTION] Apply topically 2 (two) times a day.       clobetasol (TEMOVATE) 0.05 % external solution [CLOBETASOL (TEMOVATE) 0.05 % EXTERNAL SOLUTION] APPLY TO SCALP LESIONS ONE TO TWO  TIMES D PRN  2     ketoconazole (NIZORAL) 2 % shampoo [KETOCONAZOLE (NIZORAL) 2 % SHAMPOO] Apply topically 2 (two) times a week. Apply to damp skin, lather, leave on 5 minutes, and rinse       ondansetron (ZOFRAN) 4 MG tablet [ONDANSETRON (ZOFRAN) 4 MG TABLET] Take 1-2 tab po 4 times daily prn nausea, max daily dose 4 tab (Patient not taking: Reported on 1/17/2022) 20 tablet 0         ALLERGIES:  Allergies   Allergen Reactions     Venom-Honey Bee [Bee Venom] Anaphylaxis     Benadryl [Diphenhydramine] Other (See Comments)     IV, painful at site of IV.      Contrast [Iohexol] Unknown         PAST MEDICAL HISTORY:  Past Medical History:   Diagnosis Date     Breast cancer (H)          PAST SURGICAL HISTORY:  Past Surgical History:   Procedure Laterality Date     LAPAROSCOPIC HYSTERECTOMY TOTAL      for precancerous cells uterine     MASTECTOMY Bilateral      OTHER SURGICAL HISTORY Bilateral 2002    mastectomy         SOCIAL HISTORY:  Social History     Socioeconomic History     Marital status:      Spouse name: Not on file     Number of children: Not on file     Years of education: Not on file     Highest education level: Not on file   Occupational History     Not on file   Tobacco Use     Smoking status: Never Smoker     Smokeless tobacco: Never Used   Substance and Sexual Activity     Alcohol use: Not on file     Drug use: Never     Sexual activity: Not on file   Other Topics Concern     Not on file   Social History Narrative     Not on file     Social Determinants of Health     Financial Resource Strain: Not on file   Food Insecurity: Not on file   Transportation Needs: Not on file   Physical Activity: Not on file   Stress: Not on file   Social Connections: Not on file   Intimate Partner Violence: Not on file   Housing Stability: Not on file         FAMILY HISTORY:  No change in family history last testing was in 2016 and negative    PHYSICAL EXAM:  Vital signs:  /67   Pulse 74   Temp 98.3  F (36.8  " C) (Oral)   Resp 16   Ht 1.676 m (5' 6\")   Wt 66.3 kg (146 lb 3.2 oz)   SpO2 99%   BMI 23.60 kg/m     ECO  GENERAL/CONSTITUTIONAL: No acute distress.  EYES: Pupils are equal, round, and react to light and accommodation. Extraocular movements intact.  No scleral icterus.  ENT/MOUTH: Neck supple. Oropharynx clear, no mucositis.  LYMPH: No anterior cervical, posterior cervical, supraclavicular, axillary or inguinal adenopathy.   RESPIRATORY: Clear to auscultation bilaterally. No crackles or wheezing.   CARDIOVASCULAR: Regular rate and rhythm without murmurs, gallops, or rubs.  GASTROINTESTINAL: No hepatosplenomegaly, masses, or tenderness. The patient has normal bowel sounds. No guarding.  No distention.  MUSCULOSKELETAL: Warm and well-perfused, no cyanosis, clubbing, or edema.  NEUROLOGIC: Cranial nerves II-XII are intact. Alert, oriented, answers questions appropriately.  INTEGUMENTARY: No rashes or jaundice.  GAIT: Steady, does not use assistive device  Status post bilateral mastectomies with no evidence of recurrence on the chest wall.  No axillary adenopathy    LABS:  CBC RESULTS:   Recent Labs   Lab Test 22  1550   WBC 6.8   RBC 4.39   HGB 13.5   HCT 41.5   MCV 95   MCH 30.8   MCHC 32.5   RDW 13.2          Recent Labs   Lab Test 22  1550 19  1315    140  141   POTASSIUM 3.6 3.9  3.9   CHLORIDE 104 106  106   CO2 30 26  27   ANIONGAP 3 8  8   * 108  108   BUN 18 13  13   CR 0.73 0.77  0.78   BRIDGER 9.5 9.9  9.8         PATHOLOGY:  As per HPI    IMAGING:  None    ASSESSMENT/PLAN:  Rosalinda is a very pleasant 56 year old female with history of breast cancer almost 20 years ago    Discussed with Kerrie that she can be discharged from the clinic but she prefers to come in yearly to get her labs drawn and visit. She is CC of some back strain after heavy lifting and I will give her a muscle relaxant and I have asked her not to lift anything heavy for 4 weeks. " Alternatively she can do prednisone for 5 days, if the muscle relaxant is too expensive    Discussed BMD in detail she has mild osteoperosis,, do Algae Dave and flax seed 2 tbsp a day . Repeat bmd in one year     1 breast cancer: cured. No need for further follow up but patient prefers it so will continue yearly  2 DEXA in November 2021 showed mild osteopenia. Stop current d and do algae dave and flax see  3 muscle strain : see above, prednisone or skelaxin for a short duration        TT spent 80  min, 45 min with the patient reviewing findings above, 15 min reviewing previous records, 10  min electronically documenting clinical information from today's visit and 10 entering orders  Ildefonso Stevens MD  Hematology/Oncology  [facility]

## 2022-01-19 NOTE — TELEPHONE ENCOUNTER
"Please see provider comments below from Dr. Stevens:    \"Tell Kerrie she can just do the Algae albino and no additional d thanks.\"      Writer called the patient an notified her of this. No further questions or concerns at this time.    Mahi Gautam RN, BSN, PHN  M.[facility]    "

## 2022-03-12 ENCOUNTER — HEALTH MAINTENANCE LETTER (OUTPATIENT)
Age: 57
End: 2022-03-12

## 2022-05-09 ENCOUNTER — OFFICE VISIT (OUTPATIENT)
Dept: FAMILY MEDICINE | Facility: CLINIC | Age: 57
End: 2022-05-09
Payer: COMMERCIAL

## 2022-05-09 VITALS
OXYGEN SATURATION: 100 % | BODY MASS INDEX: 23.95 KG/M2 | HEIGHT: 66 IN | SYSTOLIC BLOOD PRESSURE: 99 MMHG | WEIGHT: 149 LBS | DIASTOLIC BLOOD PRESSURE: 67 MMHG | HEART RATE: 63 BPM | RESPIRATION RATE: 16 BRPM

## 2022-05-09 DIAGNOSIS — K59.00 CONSTIPATION, UNSPECIFIED CONSTIPATION TYPE: ICD-10-CM

## 2022-05-09 DIAGNOSIS — Z85.3 HX: BREAST CANCER: ICD-10-CM

## 2022-05-09 DIAGNOSIS — M81.0 AGE RELATED OSTEOPOROSIS, UNSPECIFIED PATHOLOGICAL FRACTURE PRESENCE: Primary | ICD-10-CM

## 2022-05-09 DIAGNOSIS — Z90.13 H/O BILATERAL MASTECTOMY: ICD-10-CM

## 2022-05-09 DIAGNOSIS — Z13.220 SCREENING CHOLESTEROL LEVEL: ICD-10-CM

## 2022-05-09 DIAGNOSIS — Z90.710 H/O: HYSTERECTOMY: ICD-10-CM

## 2022-05-09 DIAGNOSIS — L29.9 SCALP ITCH: ICD-10-CM

## 2022-05-09 DIAGNOSIS — L98.9 SKIN LESION: ICD-10-CM

## 2022-05-09 DIAGNOSIS — Z91.030 BEE ALLERGY STATUS: ICD-10-CM

## 2022-05-09 PROCEDURE — 80061 LIPID PANEL: CPT | Performed by: INTERNAL MEDICINE

## 2022-05-09 PROCEDURE — 36415 COLL VENOUS BLD VENIPUNCTURE: CPT | Performed by: INTERNAL MEDICINE

## 2022-05-09 PROCEDURE — 84443 ASSAY THYROID STIM HORMONE: CPT | Performed by: INTERNAL MEDICINE

## 2022-05-09 PROCEDURE — 99203 OFFICE O/P NEW LOW 30 MIN: CPT | Performed by: INTERNAL MEDICINE

## 2022-05-09 ASSESSMENT — PAIN SCALES - GENERAL: PAINLEVEL: NO PAIN (0)

## 2022-05-09 NOTE — PROGRESS NOTES
Assessment & Plan     Age related osteoporosis, unspecified pathological fracture presence  Monitoring with heme/onc with consideration for rx next year if worsening  - TSH with free T4 reflex    Skin lesion  Below R eye. Refer to plastic surgeon.  - Plastic Surgery Referral    Scalp itch  Refractory to ketoconozole and clobetasol shampoos. Encourage her to follow-up with derm. Referral placed.  - Adult Dermatology Referral    Constipation, unspecified constipation type  Intermittent, currently under control with dietary modifications/supplements.  UTD w/ cscope  Check TSH  - TSH with free T4 reflex    Screening cholesterol level  - Lipid panel reflex to direct LDL Fasting    History of breast ca: s/p bilateral mastectomy, follows Dr Stevens  History of precancerous uterine cells s/p hysterectomy-follows gyn    Return in about 6 months (around 11/9/2022) for Routine preventive, with me, in person, sooner if symptoms worsen or do not improve.    DO SEAMUS [facility] MILAN Sy is a 56 year old who presents for the following health issues     HPI     Establish care    Former PCP: none  Specialists: oncologist: Dr Stevens; gyn-[facility]  Problem list and medications reviewed and updated. See below for additional notes.  Social: nonsmoker, social etoh    Epi pen for bee allergy, declines needing refill at this time    Takes vitamin D/calcium for osteoporosis, recent diagnosis (mild). Plans to repeat with heme/onc dxa in one year and if worsening, will consider rx.     20 years ago had breast ca- R side, bilateral mastectomy  No reconstruction    Precancerous uterine cells-hysterectomy 10 years. Sees gyn    Does strength training and regular exercise    Hx constipation, long standing. Currently doing well. Gets fiber/flax seeds in her diet. No stool form changes or blood in stool. UTD w/ cscope.    Insomnia-parents have trouble sleeping too. States melatonin and  "magnesium work well for her.    Spot below R eye. Has been evaluated by derm. Prefers to have removed by a plastic surgeon rather than derm.    Itchy scalp, shampoos ineffective. Has seen derm for this in the past but unsure about next steps. Inquires about referral to different derm.    Review of Systems   Constitutional, HEENT, cardiovascular, pulmonary, gi and gu systems are negative, except as otherwise noted.      Objective    BP 99/67 (BP Location: Left arm, Patient Position: Sitting, Cuff Size: Adult Regular)   Pulse 63   Resp 16   Ht 1.676 m (5' 6\")   Wt 67.6 kg (149 lb)   SpO2 100%   BMI 24.05 kg/m    Body mass index is 24.05 kg/m .  Physical Exam     GENERAL APPEARANCE: AAOx3, no distress. Well developed.    PSYCH: appropriate mood and affect.           "

## 2022-05-10 LAB
CHOLEST SERPL-MCNC: 184 MG/DL
FASTING STATUS PATIENT QL REPORTED: NO
HDLC SERPL-MCNC: 89 MG/DL
LDLC SERPL CALC-MCNC: 81 MG/DL
NONHDLC SERPL-MCNC: 95 MG/DL
TRIGL SERPL-MCNC: 72 MG/DL
TSH SERPL DL<=0.005 MIU/L-ACNC: 1.47 MU/L (ref 0.4–4)

## 2022-08-05 ENCOUNTER — LAB REQUISITION (OUTPATIENT)
Dept: LAB | Facility: CLINIC | Age: 57
End: 2022-08-05
Payer: COMMERCIAL

## 2022-08-05 DIAGNOSIS — D48.5 NEOPLASM OF UNCERTAIN BEHAVIOR OF SKIN: ICD-10-CM

## 2022-08-05 PROCEDURE — 88305 TISSUE EXAM BY PATHOLOGIST: CPT | Mod: 26 | Performed by: PATHOLOGY

## 2022-08-05 PROCEDURE — 88305 TISSUE EXAM BY PATHOLOGIST: CPT | Mod: TC,ORL | Performed by: OPHTHALMOLOGY

## 2022-08-08 LAB
PATH REPORT.COMMENTS IMP SPEC: NORMAL
PATH REPORT.COMMENTS IMP SPEC: NORMAL
PATH REPORT.FINAL DX SPEC: NORMAL
PATH REPORT.GROSS SPEC: NORMAL
PATH REPORT.MICROSCOPIC SPEC OTHER STN: NORMAL
PATH REPORT.RELEVANT HX SPEC: NORMAL
PHOTO IMAGE: NORMAL

## 2022-10-21 ENCOUNTER — TELEPHONE (OUTPATIENT)
Dept: ONCOLOGY | Facility: CLINIC | Age: 57
End: 2022-10-21

## 2022-10-21 NOTE — TELEPHONE ENCOUNTER
Called Kerrie left message on her VM for her to call scheduling to schedule labs and follow up with Dr. Stevens. Charlotte Obando RN,BSN,OCN,CBCN

## 2022-11-03 ENCOUNTER — APPOINTMENT (OUTPATIENT)
Dept: URBAN - METROPOLITAN AREA CLINIC 260 | Age: 57
Setting detail: DERMATOLOGY
End: 2022-11-04

## 2022-11-03 VITALS — HEIGHT: 65 IN | WEIGHT: 141 LBS

## 2022-11-03 DIAGNOSIS — L82.0 INFLAMED SEBORRHEIC KERATOSIS: ICD-10-CM

## 2022-11-03 DIAGNOSIS — L57.0 ACTINIC KERATOSIS: ICD-10-CM

## 2022-11-03 DIAGNOSIS — L29.8 OTHER PRURITUS: ICD-10-CM

## 2022-11-03 PROCEDURE — OTHER LIQUID NITROGEN: OTHER

## 2022-11-03 PROCEDURE — 99204 OFFICE O/P NEW MOD 45 MIN: CPT | Mod: 25

## 2022-11-03 PROCEDURE — 17110 DESTRUCT B9 LESION 1-14: CPT

## 2022-11-03 PROCEDURE — OTHER MIPS QUALITY: OTHER

## 2022-11-03 PROCEDURE — OTHER PRESCRIPTION: OTHER

## 2022-11-03 PROCEDURE — OTHER DEFER: OTHER

## 2022-11-03 PROCEDURE — OTHER COUNSELING: OTHER

## 2022-11-03 RX ORDER — GABAPENTIN 100 MG/1
CAPSULE ORAL
Qty: 90 | Refills: 0 | Status: ERX | COMMUNITY
Start: 2022-11-03

## 2022-11-03 ASSESSMENT — LOCATION SIMPLE DESCRIPTION DERM
LOCATION SIMPLE: SCALP
LOCATION SIMPLE: LEFT THIGH
LOCATION SIMPLE: LEFT FOREHEAD

## 2022-11-03 ASSESSMENT — LOCATION DETAILED DESCRIPTION DERM
LOCATION DETAILED: LEFT ANTERIOR PROXIMAL THIGH
LOCATION DETAILED: LEFT FOREHEAD
LOCATION DETAILED: RIGHT SUPERIOR PARIETAL SCALP

## 2022-11-03 ASSESSMENT — LOCATION ZONE DERM
LOCATION ZONE: FACE
LOCATION ZONE: LEG
LOCATION ZONE: SCALP

## 2022-11-03 NOTE — PROCEDURE: LIQUID NITROGEN
Spray Paint Text: The liquid nitrogen was applied to the skin utilizing a spray paint frosting technique.
Show Spray Paint Technique Variable?: Yes
Post-Care Instructions: I reviewed with the patient in detail post-care instructions. Patient is to wear sunprotection, and avoid picking at any of the treated lesions. Pt may apply Vaseline to crusted or scabbing areas.
Render Note In Bullet Format When Appropriate: No
Medical Necessity Information: It is in your best interest to select a reason for this procedure from the list below. All of these items fulfill various CMS LCD requirements except the new and changing color options.
Detail Level: Detailed
Medical Necessity Clause: This procedure was medically necessary because the lesions that were treated were:
Consent: The patient's consent was obtained including but not limited to risks of crusting, scabbing, blistering, scarring, darker or lighter pigmentary change, recurrence, incomplete removal and infection.

## 2022-11-03 NOTE — PROCEDURE: DEFER
Detail Level: Detailed
Size Of Lesion In Cm (Optional): 0
Introduction Text (Please End With A Colon): The following procedure was deferred: We will do patch testing next week.

## 2022-11-03 NOTE — HPI: RASH
What Type Of Note Output Would You Prefer (Optional)?: Standard Output
Is The Patient Presenting As Previously Scheduled?: Yes
How Severe Is Your Rash?: moderate
Is This A New Presentation, Or A Follow-Up?: Rash
Additional History: Patient presents today with an itchy , flaky scalp. Patient reports this has been going on for a very long time and that she did see a dermatologist in the past , but she doesn’t remember if they had a diagnosis, but states that they prescribed her topicals and she states she didn’t use them because they sounded as though those shouldn’t be applied to the skin, so instead she has been doing home remedies such as apple cider vine. Patient reports that it’s worse when she is cold. Patient would like to know what she can use to resolve her scalp situation. Patient presents for further evaluation and treatment.

## 2022-12-12 ENCOUNTER — ANCILLARY PROCEDURE (OUTPATIENT)
Dept: BONE DENSITY | Facility: CLINIC | Age: 57
End: 2022-12-12
Attending: INTERNAL MEDICINE
Payer: COMMERCIAL

## 2022-12-12 DIAGNOSIS — Z91.030 BEE STING ALLERGY: ICD-10-CM

## 2022-12-12 DIAGNOSIS — Z85.3 PERSONAL HISTORY OF MALIGNANT NEOPLASM OF BREAST: ICD-10-CM

## 2022-12-12 PROCEDURE — 77080 DXA BONE DENSITY AXIAL: CPT | Mod: TC | Performed by: RADIOLOGY

## 2023-01-12 ENCOUNTER — APPOINTMENT (OUTPATIENT)
Dept: URBAN - METROPOLITAN AREA CLINIC 260 | Age: 58
Setting detail: DERMATOLOGY
End: 2023-01-16

## 2023-01-12 VITALS — HEIGHT: 65 IN | WEIGHT: 141 LBS

## 2023-01-12 DIAGNOSIS — D22 MELANOCYTIC NEVI: ICD-10-CM

## 2023-01-12 DIAGNOSIS — L81.4 OTHER MELANIN HYPERPIGMENTATION: ICD-10-CM

## 2023-01-12 DIAGNOSIS — L82.1 OTHER SEBORRHEIC KERATOSIS: ICD-10-CM

## 2023-01-12 DIAGNOSIS — Z71.89 OTHER SPECIFIED COUNSELING: ICD-10-CM

## 2023-01-12 DIAGNOSIS — D18.0 HEMANGIOMA: ICD-10-CM

## 2023-01-12 PROBLEM — D22.5 MELANOCYTIC NEVI OF TRUNK: Status: ACTIVE | Noted: 2023-01-12

## 2023-01-12 PROBLEM — D18.01 HEMANGIOMA OF SKIN AND SUBCUTANEOUS TISSUE: Status: ACTIVE | Noted: 2023-01-12

## 2023-01-12 PROCEDURE — OTHER MIPS QUALITY: OTHER

## 2023-01-12 PROCEDURE — 99213 OFFICE O/P EST LOW 20 MIN: CPT

## 2023-01-12 PROCEDURE — OTHER COUNSELING: OTHER

## 2023-01-12 ASSESSMENT — LOCATION ZONE DERM: LOCATION ZONE: TRUNK

## 2023-01-12 ASSESSMENT — LOCATION SIMPLE DESCRIPTION DERM: LOCATION SIMPLE: UPPER BACK

## 2023-01-12 ASSESSMENT — LOCATION DETAILED DESCRIPTION DERM: LOCATION DETAILED: INFERIOR THORACIC SPINE

## 2023-01-17 ENCOUNTER — LAB (OUTPATIENT)
Dept: INFUSION THERAPY | Facility: CLINIC | Age: 58
End: 2023-01-17
Attending: INTERNAL MEDICINE
Payer: COMMERCIAL

## 2023-01-17 DIAGNOSIS — Z91.030 BEE STING ALLERGY: ICD-10-CM

## 2023-01-17 DIAGNOSIS — Z85.3 PERSONAL HISTORY OF MALIGNANT NEOPLASM OF BREAST: ICD-10-CM

## 2023-01-17 LAB
ALBUMIN SERPL-MCNC: 4 G/DL (ref 3.5–5)
ALP SERPL-CCNC: 48 U/L (ref 45–120)
ALT SERPL W P-5'-P-CCNC: 12 U/L (ref 0–45)
ANION GAP SERPL CALCULATED.3IONS-SCNC: 7 MMOL/L (ref 5–18)
AST SERPL W P-5'-P-CCNC: 15 U/L (ref 0–40)
BASOPHILS # BLD AUTO: 0 10E3/UL (ref 0–0.2)
BASOPHILS NFR BLD AUTO: 1 %
BILIRUB SERPL-MCNC: 0.4 MG/DL (ref 0–1)
BUN SERPL-MCNC: 11 MG/DL (ref 8–22)
CALCIUM SERPL-MCNC: 9.6 MG/DL (ref 8.5–10.5)
CHLORIDE BLD-SCNC: 105 MMOL/L (ref 98–107)
CO2 SERPL-SCNC: 29 MMOL/L (ref 22–31)
CREAT SERPL-MCNC: 0.74 MG/DL (ref 0.6–1.1)
EOSINOPHIL # BLD AUTO: 0.1 10E3/UL (ref 0–0.7)
EOSINOPHIL NFR BLD AUTO: 2 %
ERYTHROCYTE [DISTWIDTH] IN BLOOD BY AUTOMATED COUNT: 13.2 % (ref 10–15)
GFR SERPL CREATININE-BSD FRML MDRD: >90 ML/MIN/1.73M2
GLUCOSE BLD-MCNC: 85 MG/DL (ref 70–125)
HCT VFR BLD AUTO: 39.7 % (ref 35–47)
HGB BLD-MCNC: 13.2 G/DL (ref 11.7–15.7)
IMM GRANULOCYTES # BLD: 0 10E3/UL
IMM GRANULOCYTES NFR BLD: 0 %
LYMPHOCYTES # BLD AUTO: 2 10E3/UL (ref 0.8–5.3)
LYMPHOCYTES NFR BLD AUTO: 31 %
MCH RBC QN AUTO: 31.1 PG (ref 26.5–33)
MCHC RBC AUTO-ENTMCNC: 33.2 G/DL (ref 31.5–36.5)
MCV RBC AUTO: 93 FL (ref 78–100)
MONOCYTES # BLD AUTO: 0.3 10E3/UL (ref 0–1.3)
MONOCYTES NFR BLD AUTO: 5 %
NEUTROPHILS # BLD AUTO: 4 10E3/UL (ref 1.6–8.3)
NEUTROPHILS NFR BLD AUTO: 61 %
NRBC # BLD AUTO: 0 10E3/UL
NRBC BLD AUTO-RTO: 0 /100
PLATELET # BLD AUTO: 189 10E3/UL (ref 150–450)
POTASSIUM BLD-SCNC: 3.9 MMOL/L (ref 3.5–5)
PROT SERPL-MCNC: 7.2 G/DL (ref 6–8)
RBC # BLD AUTO: 4.25 10E6/UL (ref 3.8–5.2)
SODIUM SERPL-SCNC: 141 MMOL/L (ref 136–145)
WBC # BLD AUTO: 6.6 10E3/UL (ref 4–11)

## 2023-01-17 PROCEDURE — 86300 IMMUNOASSAY TUMOR CA 15-3: CPT

## 2023-01-17 PROCEDURE — 80053 COMPREHEN METABOLIC PANEL: CPT

## 2023-01-17 PROCEDURE — 36415 COLL VENOUS BLD VENIPUNCTURE: CPT

## 2023-01-17 PROCEDURE — 82378 CARCINOEMBRYONIC ANTIGEN: CPT

## 2023-01-17 PROCEDURE — 85025 COMPLETE CBC W/AUTO DIFF WBC: CPT

## 2023-01-18 LAB — CEA SERPL-MCNC: 1 NG/ML

## 2023-01-20 LAB — CANCER AG27-29 SERPL-ACNC: 16.5 U/ML

## 2023-01-23 ENCOUNTER — ONCOLOGY VISIT (OUTPATIENT)
Dept: ONCOLOGY | Facility: CLINIC | Age: 58
End: 2023-01-23
Attending: INTERNAL MEDICINE
Payer: COMMERCIAL

## 2023-01-23 VITALS
DIASTOLIC BLOOD PRESSURE: 70 MMHG | HEIGHT: 66 IN | HEART RATE: 66 BPM | BODY MASS INDEX: 23.98 KG/M2 | RESPIRATION RATE: 16 BRPM | WEIGHT: 149.2 LBS | OXYGEN SATURATION: 100 % | SYSTOLIC BLOOD PRESSURE: 114 MMHG

## 2023-01-23 DIAGNOSIS — Z85.3 HX: BREAST CANCER: ICD-10-CM

## 2023-01-23 DIAGNOSIS — M81.0 AGE-RELATED OSTEOPOROSIS WITHOUT CURRENT PATHOLOGICAL FRACTURE: ICD-10-CM

## 2023-01-23 DIAGNOSIS — Z85.3 PERSONAL HISTORY OF MALIGNANT NEOPLASM OF BREAST: Primary | ICD-10-CM

## 2023-01-23 DIAGNOSIS — Z90.13 H/O BILATERAL MASTECTOMY: Primary | ICD-10-CM

## 2023-01-23 DIAGNOSIS — C50.011 MALIGNANT NEOPLASM OF NIPPLE OF RIGHT BREAST IN FEMALE, UNSPECIFIED ESTROGEN RECEPTOR STATUS (H): ICD-10-CM

## 2023-01-23 PROCEDURE — 99213 OFFICE O/P EST LOW 20 MIN: CPT | Performed by: INTERNAL MEDICINE

## 2023-01-23 PROCEDURE — G0463 HOSPITAL OUTPT CLINIC VISIT: HCPCS | Performed by: INTERNAL MEDICINE

## 2023-01-23 RX ORDER — GABAPENTIN 100 MG/1
CAPSULE ORAL
COMMUNITY
Start: 2022-11-03

## 2023-01-23 ASSESSMENT — PAIN SCALES - GENERAL: PAINLEVEL: NO PAIN (0)

## 2023-01-23 NOTE — PROGRESS NOTES
[facility]    Hematology/Oncology New Patient Note      Today's Date: Jan 23, 2023      Reason for Consult: Breast cancer      HISTORY OF PRESENT ILLNESS:     Oncology treatment Summary:    1.  February 2002 right breast mastectomy T1 cN0 M0 infiltrating ductal carcinoma ER/MA positive  2.  Left prophylactic mastectomy negative  3.  Adriamycin Cytoxan completed in May 2002  4.  Tamoxifen for 5 years completing in May 2007  5.  Status post CIRO/BSO    Interval history: Rosalinda returns today for follow-up.  She is clinically doing well.  Bone density scan previously was with a T score of -2.6.  .  BMD improved at T score of -2.3        REVIEW OF SYSTEMS:   14 point ROS was reviewed and is negative other than as noted above in HPI.       HOME MEDICATIONS:  Current Outpatient Medications   Medication Sig Dispense Refill     CALCIUM CARBONATE (CALCIUM 500 ORAL) [CALCIUM CARBONATE (CALCIUM 500 ORAL)] Take by mouth.       cholecalciferol, vitamin D3, (VITAMIN D3) 1,000 unit capsule [CHOLECALCIFEROL, VITAMIN D3, (VITAMIN D3) 1,000 UNIT CAPSULE] Take 1,000 Units by mouth daily.       EPINEPHrine (EPIPEN) 0.3 mg/0.3 mL injection [EPINEPHRINE (EPIPEN) 0.3 MG/0.3 ML INJECTION] Inject 0.3 mL (0.3 mg total) into the shoulder, thigh, or buttocks once as needed (anaphylaxis). 1 6     gabapentin (NEURONTIN) 100 MG capsule PLEASE SEE ATTACHED FOR DETAILED DIRECTIONS           ALLERGIES:  Allergies   Allergen Reactions     Venom-Honey Bee [Bee Venom] Anaphylaxis     Benadryl [Diphenhydramine] Other (See Comments)     IV, painful at site of IV.      Contrast [Iohexol] Unknown         PAST MEDICAL HISTORY:  Past Medical History:   Diagnosis Date     Breast cancer (H)          PAST SURGICAL HISTORY:  Past Surgical History:   Procedure Laterality Date     LAPAROSCOPIC HYSTERECTOMY TOTAL      for precancerous cells uterine     MASTECTOMY Bilateral      OTHER SURGICAL HISTORY Bilateral 2002    mastectomy  "        SOCIAL HISTORY:  Social History     Socioeconomic History     Marital status:      Spouse name: Not on file     Number of children: Not on file     Years of education: Not on file     Highest education level: Not on file   Occupational History     Not on file   Tobacco Use     Smoking status: Never     Smokeless tobacco: Never   Substance and Sexual Activity     Alcohol use: Not on file     Drug use: Never     Sexual activity: Not on file   Other Topics Concern     Not on file   Social History Narrative     Not on file     Social Determinants of Health     Financial Resource Strain: Not on file   Food Insecurity: Not on file   Transportation Needs: Not on file   Physical Activity: Not on file   Stress: Not on file   Social Connections: Not on file   Intimate Partner Violence: Not on file   Housing Stability: Not on file         FAMILY HISTORY:  No change in family history last testing was in 2016 and negative    PHYSICAL EXAM:  Vital signs:  /70   Pulse 66   Resp 16   Ht 1.676 m (5' 6\")   Wt 67.7 kg (149 lb 3.2 oz)   SpO2 100%   BMI 24.08 kg/m     ECO  GENERAL/CONSTITUTIONAL: No acute distress.  EYES: Pupils are equal, round, and react to light and accommodation. Extraocular movements intact.  No scleral icterus.  ENT/MOUTH: Neck supple. Oropharynx clear, no mucositis.  LYMPH: No anterior cervical, posterior cervical, supraclavicular, axillary or inguinal adenopathy.   RESPIRATORY: Clear to auscultation bilaterally. No crackles or wheezing.   CARDIOVASCULAR: Regular rate and rhythm without murmurs, gallops, or rubs.  GASTROINTESTINAL: No hepatosplenomegaly, masses, or tenderness. The patient has normal bowel sounds. No guarding.  No distention.  MUSCULOSKELETAL: Warm and well-perfused, no cyanosis, clubbing, or edema.  NEUROLOGIC: Cranial nerves II-XII are intact. Alert, oriented, answers questions appropriately.  INTEGUMENTARY: No rashes or jaundice.  GAIT: Steady, does not use " assistive device  Status post bilateral mastectomies with no evidence of recurrence on the chest wall.  No axillary adenopathy    LABS:  reviewed    PATHOLOGY:  As per HPI    IMAGING:  None    ASSESSMENT/PLAN:  Rosalinda is a very pleasant 56 year old female with history of breast cancer almost 20 years ago    Her bone density scan suggests osteopenia now on her osteoporosis however based on the reading it appears that the bone density scan is worse we will have the radiologist take a questionable get that and see if that was an error and add an addendum because her T-scores look like they are improving    1 breast cancer: cured. No need for further follow up but patient prefers it so will continue yearly  2 DEXA in 1 year.  Continue algae Dave and flaxseed and weightbearing exercises     Total time spent on day of visit, including review of tests, obtaining/reviewing separately obtained history, ordering medications/tests/procedures, communicating with PCP/consultants, and documenting in electronic medical record: 20 minutes    Ildefonso Stevens MD  Hematology/Oncology  [facility]

## 2023-01-23 NOTE — PROGRESS NOTES
"Oncology Rooming Note    January 23, 2023 10:04 AM   Rosalinda Garcia is a 57 year old female who presents for:    Chief Complaint   Patient presents with     Oncology Clinic Visit     HX: breast cancer     Initial Vitals: /70   Pulse 66   Resp 16   Ht 1.676 m (5' 6\")   Wt 67.7 kg (149 lb 3.2 oz)   SpO2 100%   BMI 24.08 kg/m   Estimated body mass index is 24.08 kg/m  as calculated from the following:    Height as of this encounter: 1.676 m (5' 6\").    Weight as of this encounter: 67.7 kg (149 lb 3.2 oz). Body surface area is 1.78 meters squared.  No Pain (0) Comment: Data Unavailable   No LMP recorded. Patient has had a hysterectomy.  Allergies reviewed: Yes  Medications reviewed: Yes    Medications: Medication refills not needed today.  Pharmacy name entered into [facility]: [id number] - [place] [id number] [address] AT [facility] [address]    Clinical concerns: None       Joseline Yang MA              "

## 2023-01-23 NOTE — LETTER
"    1/23/2023         RE: Rosalinda Garcia  [address] [address]        Dear Colleague,    Thank you for referring your patient, Rosalinda Garcia, to the [facility]. Please see a copy of my visit note below.    Oncology Rooming Note    January 23, 2023 10:04 AM   Rosalinda Garcia is a 57 year old female who presents for:    Chief Complaint   Patient presents with     Oncology Clinic Visit     HX: breast cancer     Initial Vitals: /70   Pulse 66   Resp 16   Ht 1.676 m (5' 6\")   Wt 67.7 kg (149 lb 3.2 oz)   SpO2 100%   BMI 24.08 kg/m   Estimated body mass index is 24.08 kg/m  as calculated from the following:    Height as of this encounter: 1.676 m (5' 6\").    Weight as of this encounter: 67.7 kg (149 lb 3.2 oz). Body surface area is 1.78 meters squared.  No Pain (0) Comment: Data Unavailable   No LMP recorded. Patient has had a hysterectomy.  Allergies reviewed: Yes  Medications reviewed: Yes    Medications: Medication refills not needed today.  Pharmacy name entered into [employer]: [facility] #[id number] - [place] [id number] [address] AT [facility] [address]    Clinical concerns: None       Joseline Yang MA                [facility]    Hematology/Oncology New Patient Note      Today's Date: Jan 23, 2023      Reason for Consult: Breast cancer      HISTORY OF PRESENT ILLNESS:     Oncology treatment Summary:    1.  February 2002 right breast mastectomy T1 cN0 M0 infiltrating ductal carcinoma ER/IA positive  2.  Left prophylactic mastectomy negative  3.  Adriamycin Cytoxan completed in May 2002  4.  Tamoxifen for 5 years completing in May 2007  5.  Status post CIRO/BSO    Interval history: Rosalinda returns today for follow-up.  She is clinically doing well.  Bone density scan previously was with a T score of -2.6.  .  BMD improved at T score of -2.3        REVIEW OF SYSTEMS:   14 point ROS was reviewed and is negative " other than as noted above in HPI.       HOME MEDICATIONS:  Current Outpatient Medications   Medication Sig Dispense Refill     CALCIUM CARBONATE (CALCIUM 500 ORAL) [CALCIUM CARBONATE (CALCIUM 500 ORAL)] Take by mouth.       cholecalciferol, vitamin D3, (VITAMIN D3) 1,000 unit capsule [CHOLECALCIFEROL, VITAMIN D3, (VITAMIN D3) 1,000 UNIT CAPSULE] Take 1,000 Units by mouth daily.       EPINEPHrine (EPIPEN) 0.3 mg/0.3 mL injection [EPINEPHRINE (EPIPEN) 0.3 MG/0.3 ML INJECTION] Inject 0.3 mL (0.3 mg total) into the shoulder, thigh, or buttocks once as needed (anaphylaxis). 1 6     gabapentin (NEURONTIN) 100 MG capsule PLEASE SEE ATTACHED FOR DETAILED DIRECTIONS           ALLERGIES:  Allergies   Allergen Reactions     Venom-Honey Bee [Bee Venom] Anaphylaxis     Benadryl [Diphenhydramine] Other (See Comments)     IV, painful at site of IV.      Contrast [Iohexol] Unknown         PAST MEDICAL HISTORY:  Past Medical History:   Diagnosis Date     Breast cancer (H)          PAST SURGICAL HISTORY:  Past Surgical History:   Procedure Laterality Date     LAPAROSCOPIC HYSTERECTOMY TOTAL      for precancerous cells uterine     MASTECTOMY Bilateral      OTHER SURGICAL HISTORY Bilateral 2002    mastectomy         SOCIAL HISTORY:  Social History     Socioeconomic History     Marital status:      Spouse name: Not on file     Number of children: Not on file     Years of education: Not on file     Highest education level: Not on file   Occupational History     Not on file   Tobacco Use     Smoking status: Never     Smokeless tobacco: Never   Substance and Sexual Activity     Alcohol use: Not on file     Drug use: Never     Sexual activity: Not on file   Other Topics Concern     Not on file   Social History Narrative     Not on file     Social Determinants of Health     Financial Resource Strain: Not on file   Food Insecurity: Not on file   Transportation Needs: Not on file   Physical Activity: Not on file   Stress: Not on file  "  Social Connections: Not on file   Intimate Partner Violence: Not on file   Housing Stability: Not on file         FAMILY HISTORY:  No change in family history last testing was in 2016 and negative    PHYSICAL EXAM:  Vital signs:  /70   Pulse 66   Resp 16   Ht 1.676 m (5' 6\")   Wt 67.7 kg (149 lb 3.2 oz)   SpO2 100%   BMI 24.08 kg/m     ECO  GENERAL/CONSTITUTIONAL: No acute distress.  EYES: Pupils are equal, round, and react to light and accommodation. Extraocular movements intact.  No scleral icterus.  ENT/MOUTH: Neck supple. Oropharynx clear, no mucositis.  LYMPH: No anterior cervical, posterior cervical, supraclavicular, axillary or inguinal adenopathy.   RESPIRATORY: Clear to auscultation bilaterally. No crackles or wheezing.   CARDIOVASCULAR: Regular rate and rhythm without murmurs, gallops, or rubs.  GASTROINTESTINAL: No hepatosplenomegaly, masses, or tenderness. The patient has normal bowel sounds. No guarding.  No distention.  MUSCULOSKELETAL: Warm and well-perfused, no cyanosis, clubbing, or edema.  NEUROLOGIC: Cranial nerves II-XII are intact. Alert, oriented, answers questions appropriately.  INTEGUMENTARY: No rashes or jaundice.  GAIT: Steady, does not use assistive device  Status post bilateral mastectomies with no evidence of recurrence on the chest wall.  No axillary adenopathy    LABS:  reviewed    PATHOLOGY:  As per HPI    IMAGING:  None    ASSESSMENT/PLAN:  Rosalinda is a very pleasant 56 year old female with history of breast cancer almost 20 years ago    Her bone density scan suggests osteopenia now on her osteoporosis however based on the reading it appears that the bone density scan is worse we will have the radiologist take a questionable get that and see if that was an error and add an addendum because her T-scores look like they are improving    1 breast cancer: cured. No need for further follow up but patient prefers it so will continue yearly  2 DEXA in 1 year.  Continue " algae Dave and flaxseed and weightbearing exercises     Total time spent on day of visit, including review of tests, obtaining/reviewing separately obtained history, ordering medications/tests/procedures, communicating with PCP/consultants, and documenting in electronic medical record: 20 minutes    Ildefonso Stevens MD  Hematology/Oncology  [facility]      Again, thank you for allowing me to participate in the care of your patient.        Sincerely,        Ildefonso Stevens MD

## 2023-01-26 ENCOUNTER — TELEPHONE (OUTPATIENT)
Dept: ONCOLOGY | Facility: CLINIC | Age: 58
End: 2023-01-26
Payer: COMMERCIAL

## 2023-01-26 DIAGNOSIS — Z91.030 BEE STING ALLERGY: ICD-10-CM

## 2023-01-26 RX ORDER — EPINEPHRINE 0.3 MG/.3ML
0.3 INJECTION SUBCUTANEOUS
Qty: 2 EACH | Refills: 6 | Status: SHIPPED | OUTPATIENT
Start: 2023-01-26

## 2023-01-26 NOTE — TELEPHONE ENCOUNTER
Kerrie called clinic inquiring about her bone density interpretation and if it has been reread by the radiologist.   Called Kerrie back stating that bone density has not been reread yet. She asked for refill of her Epi pen secondary to her bee stings which was sent. She also inquired about a refill of her Lactulose which Dr. Stevens prescribed 5 years ago for her constipation. Stated to patient that writer will need to confirm lactulose script with Dr. Stevens to see if she would refill it. Charlotte Obando RN,BSN,OCN,CBCN

## 2023-01-30 DIAGNOSIS — K59.00 CONSTIPATION: Primary | ICD-10-CM

## 2023-01-30 RX ORDER — LACTULOSE 10 G/15ML
10 SOLUTION ORAL DAILY PRN
Qty: 237 ML | Refills: 1 | Status: SHIPPED | OUTPATIENT
Start: 2023-01-30

## 2023-02-02 NOTE — TELEPHONE ENCOUNTER
Called Radiology regarding read that Dr. Stevens has requested. Writer contacted  [facility] at [phone] and message will be sent to Dr. Rao to contact Dr. Stevens with his interpretation of the Bone density scan. Charlotte Obando RN,BSN,OCN,CBCN

## 2023-02-06 NOTE — TELEPHONE ENCOUNTER
Called Radiology for a second message to have radiologist reread Bone Density and call Dr. Stevens with result. Charlotte Obando RN,BSN,OCN,CBCN

## 2023-02-08 ENCOUNTER — TELEPHONE (OUTPATIENT)
Dept: ONCOLOGY | Facility: CLINIC | Age: 58
End: 2023-02-08
Payer: COMMERCIAL

## 2023-02-08 NOTE — TELEPHONE ENCOUNTER
Called Kerrie regarding her Bone density report. Radiologist did speak to Dr. Stevens and report did not show a decline. Called Kerrie, left message on her voicemail for her to call clinic back with Dr. Stevens's recommendation. Charlotte Obando RN,BSN,OCN,CBCN

## 2023-02-09 NOTE — TELEPHONE ENCOUNTER
Called Kerrie, she is aware that Dr. Stevens spoke with radiologist regarding bone density result. No current change in Bone density. Kerrie is aware. Charlotte Obando RN,BSN,OCN,CBCN

## 2023-03-30 ENCOUNTER — OFFICE VISIT (OUTPATIENT)
Dept: FAMILY MEDICINE | Facility: CLINIC | Age: 58
End: 2023-03-30
Payer: COMMERCIAL

## 2023-03-30 VITALS
OXYGEN SATURATION: 100 % | RESPIRATION RATE: 16 BRPM | DIASTOLIC BLOOD PRESSURE: 58 MMHG | SYSTOLIC BLOOD PRESSURE: 97 MMHG | HEART RATE: 66 BPM | WEIGHT: 154.4 LBS | HEIGHT: 66 IN | BODY MASS INDEX: 24.81 KG/M2

## 2023-03-30 DIAGNOSIS — L84 CORNS AND CALLOSITIES: Primary | ICD-10-CM

## 2023-03-30 PROCEDURE — 99212 OFFICE O/P EST SF 10 MIN: CPT | Performed by: FAMILY MEDICINE

## 2023-03-30 NOTE — PROGRESS NOTES
Assessment/ Plan     1. Corns and callosities  Kerrie has had some small corns and calluses on her right foot for many years now.  Sometimes they will flare and bother her little bit more than others.  They are quite small today.  We discussed conservative therapy such as soaking and shaving down and using corn pads.  If no improvement or she desires more definitive treatment, could refer to podiatry in the future.  She will let me know through [email or web address] if she wants a referral.      Subjective:      Rosalinda Garcia is a 57 year old female who presents for evaluation of some bumps on her foot.  She states they were looking much worse and now they have come to calm down a bit.  She states that been there for many years.  Kind of along the ball of the right foot between the great toe and second toe.  She has felt some hard little bumps in there.  Sometimes she will work on them with a pumice stone.  Sometimes are more painful than others.  She is quite active and exercises regularly.  She feels like she is up-to-date on her preventative cares.  She is had a bilateral mastectomies so does not need mammograms anymore.  She sees OB/GYN and has had a hysterectomy.  She feels like she is up-to-date on her colonoscopy.    Relevant past medical, family, surgical, and social history reviewed with patient, unless noted in HPI, not pertinent for this visit.  Medications were discussed and reconciled.   Review of Systems   A 12 point comprehensive review of systems was negative except as noted.      Current Outpatient Medications   Medication Sig Dispense Refill     CALCIUM CARBONATE (CALCIUM 500 ORAL) [CALCIUM CARBONATE (CALCIUM 500 ORAL)] Take by mouth.       cholecalciferol, vitamin D3, (VITAMIN D3) 1,000 unit capsule [CHOLECALCIFEROL, VITAMIN D3, (VITAMIN D3) 1,000 UNIT CAPSULE] Take 1,000 Units by mouth daily.       EPINEPHrine (ANY BX GENERIC EQUIV) 0.3 MG/0.3ML injection 2-pack Inject 0.3 mLs (0.3 mg) into the muscle once  "as needed 2 each 6     lactulose (CHRONULAC) 10 GM/15ML solution Take 15 mLs (10 g) by mouth daily as needed for constipation 237 mL 1     gabapentin (NEURONTIN) 100 MG capsule PLEASE SEE ATTACHED FOR DETAILED DIRECTIONS           Objective:     BP 97/58   Pulse 66   Resp 16   Ht 1.676 m (5' 6\")   Wt 70 kg (154 lb 6.4 oz)   SpO2 100%   BMI 24.92 kg/m      Body mass index is 24.92 kg/m .       General appearance: alert, appears stated age and cooperative  Right foot has a series of calluses with very firm center.  There is no black dots that would make me think these are plantars warts.    No results found for this or any previous visit (from the past 168 hour(s)).       This note has been dictated using voice recognition software. Any grammatical or context distortions are unintentional and inherent to the software  Answers for HPI/ROS submitted by the patient on 3/30/2023  What is the reason for your visit today? : corn on foot  How many servings of fruits and vegetables do you eat daily?: 2-3  On average, how many sweetened beverages do you drink each day (Examples: soda, juice, sweet tea, etc.  Do NOT count diet or artificially sweetened beverages)?: 0  How many minutes a day do you exercise enough to make your heart beat faster?: 30 to 60  How many days per week do you miss taking your medication?: 0      "

## 2023-04-20 ENCOUNTER — PATIENT OUTREACH (OUTPATIENT)
Dept: CARE COORDINATION | Facility: CLINIC | Age: 58
End: 2023-04-20
Payer: COMMERCIAL

## 2023-06-02 ENCOUNTER — HEALTH MAINTENANCE LETTER (OUTPATIENT)
Age: 58
End: 2023-06-02

## 2023-12-20 ENCOUNTER — ANCILLARY PROCEDURE (OUTPATIENT)
Dept: BONE DENSITY | Facility: CLINIC | Age: 58
End: 2023-12-20
Attending: INTERNAL MEDICINE
Payer: COMMERCIAL

## 2023-12-20 PROCEDURE — 77080 DXA BONE DENSITY AXIAL: CPT | Mod: TC

## 2024-01-04 ENCOUNTER — LAB (OUTPATIENT)
Dept: LAB | Facility: CLINIC | Age: 59
End: 2024-01-04
Payer: COMMERCIAL

## 2024-01-04 DIAGNOSIS — C50.011 MALIGNANT NEOPLASM OF NIPPLE OF RIGHT BREAST IN FEMALE, UNSPECIFIED ESTROGEN RECEPTOR STATUS (H): ICD-10-CM

## 2024-01-04 DIAGNOSIS — Z85.3 PERSONAL HISTORY OF MALIGNANT NEOPLASM OF BREAST: ICD-10-CM

## 2024-01-04 LAB
ALBUMIN SERPL BCG-MCNC: 4.4 G/DL (ref 3.5–5.2)
ALP SERPL-CCNC: 51 U/L (ref 40–150)
ALT SERPL W P-5'-P-CCNC: 17 U/L (ref 0–50)
ANION GAP SERPL CALCULATED.3IONS-SCNC: 7 MMOL/L (ref 7–15)
AST SERPL W P-5'-P-CCNC: 19 U/L (ref 0–45)
BASOPHILS # BLD AUTO: 0 10E3/UL (ref 0–0.2)
BASOPHILS NFR BLD AUTO: 1 %
BILIRUB SERPL-MCNC: 0.3 MG/DL
BUN SERPL-MCNC: 19.2 MG/DL (ref 8–23)
CALCIUM SERPL-MCNC: 9.6 MG/DL (ref 8.6–10)
CANCER AG15-3 SERPL-ACNC: 16 U/ML
CEA SERPL-MCNC: 0.8 NG/ML
CHLORIDE SERPL-SCNC: 104 MMOL/L (ref 98–107)
CREAT SERPL-MCNC: 0.82 MG/DL (ref 0.51–0.95)
DEPRECATED HCO3 PLAS-SCNC: 29 MMOL/L (ref 22–29)
EGFRCR SERPLBLD CKD-EPI 2021: 82 ML/MIN/1.73M2
EOSINOPHIL # BLD AUTO: 0.1 10E3/UL (ref 0–0.7)
EOSINOPHIL NFR BLD AUTO: 3 %
ERYTHROCYTE [DISTWIDTH] IN BLOOD BY AUTOMATED COUNT: 13 % (ref 10–15)
GLUCOSE SERPL-MCNC: 75 MG/DL (ref 70–99)
HCT VFR BLD AUTO: 41.5 % (ref 35–47)
HGB BLD-MCNC: 13.4 G/DL (ref 11.7–15.7)
IMM GRANULOCYTES # BLD: 0 10E3/UL
IMM GRANULOCYTES NFR BLD: 0 %
LYMPHOCYTES # BLD AUTO: 1.6 10E3/UL (ref 0.8–5.3)
LYMPHOCYTES NFR BLD AUTO: 39 %
MCH RBC QN AUTO: 31.2 PG (ref 26.5–33)
MCHC RBC AUTO-ENTMCNC: 32.3 G/DL (ref 31.5–36.5)
MCV RBC AUTO: 97 FL (ref 78–100)
MONOCYTES # BLD AUTO: 0.3 10E3/UL (ref 0–1.3)
MONOCYTES NFR BLD AUTO: 7 %
NEUTROPHILS # BLD AUTO: 2.2 10E3/UL (ref 1.6–8.3)
NEUTROPHILS NFR BLD AUTO: 51 %
PLATELET # BLD AUTO: 185 10E3/UL (ref 150–450)
POTASSIUM SERPL-SCNC: 4.2 MMOL/L (ref 3.4–5.3)
PROT SERPL-MCNC: 7 G/DL (ref 6.4–8.3)
RBC # BLD AUTO: 4.29 10E6/UL (ref 3.8–5.2)
SODIUM SERPL-SCNC: 140 MMOL/L (ref 135–145)
WBC # BLD AUTO: 4.3 10E3/UL (ref 4–11)

## 2024-01-04 PROCEDURE — 80053 COMPREHEN METABOLIC PANEL: CPT

## 2024-01-04 PROCEDURE — 86300 IMMUNOASSAY TUMOR CA 15-3: CPT

## 2024-01-04 PROCEDURE — 36415 COLL VENOUS BLD VENIPUNCTURE: CPT

## 2024-01-04 PROCEDURE — 82378 CARCINOEMBRYONIC ANTIGEN: CPT

## 2024-01-04 PROCEDURE — 85025 COMPLETE CBC W/AUTO DIFF WBC: CPT

## 2024-01-17 ENCOUNTER — TELEPHONE (OUTPATIENT)
Dept: ONCOLOGY | Facility: CLINIC | Age: 59
End: 2024-01-17

## 2024-01-17 NOTE — TELEPHONE ENCOUNTER
Kerrie was unable to connect for her virtual appointment with Dr. Stevens today.   She was given Dr. Stevens's recommendations:      Ildefonso Stevens MD Myers, Charlotte ROMAN, RN  Let patient know all her labs look good and her bone density actually looks good there is not a concern for any changes or anything to do.  This is per patient request she would like to be seen yearly so I have put orders in for her to be seen in a year for follow-up if she would like to come in.  If she wants to come in for a physical exam I can see her on next available let her know I have not charged her for anything today as a courtesy and tell her that I am sorry I was running behind      Thank you    Kerrie has several questions that she would like to get addressed with Dr. Stevens. She was rescheduled to 2/6/24 at 0300 PM with Dr. Stevens for a telephone visit. Charlotte Obando RN,BSN,OCN,CBCN

## 2024-02-06 ENCOUNTER — VIRTUAL VISIT (OUTPATIENT)
Dept: ONCOLOGY | Facility: CLINIC | Age: 59
End: 2024-02-06
Attending: INTERNAL MEDICINE
Payer: COMMERCIAL

## 2024-02-06 VITALS — WEIGHT: 141 LBS | BODY MASS INDEX: 23.46 KG/M2

## 2024-02-06 DIAGNOSIS — C50.011 MALIGNANT NEOPLASM OF NIPPLE OF RIGHT BREAST IN FEMALE, UNSPECIFIED ESTROGEN RECEPTOR STATUS (H): Primary | ICD-10-CM

## 2024-02-06 PROCEDURE — 99442 PR PHYSICIAN TELEPHONE EVALUATION 11-20 MIN: CPT | Mod: 93 | Performed by: INTERNAL MEDICINE

## 2024-02-06 ASSESSMENT — PAIN SCALES - GENERAL: PAINLEVEL: NO PAIN (0)

## 2024-02-06 NOTE — LETTER
2/6/2024         RE: Rosalinda Garcia  [address] [address]        Dear Colleague,    Thank you for referring your patient, Rosalinda Garcia, to the [facility]. Please see a copy of my visit note below.    Virtual Visit Details    Type of service:  Telephone Visit   Phone call duration: 20 minutes     [facility]    Hematology/Oncology return pt note     Today's Date: Feb 6, 2024      Reason for Consult: Breast cancer      HISTORY OF PRESENT ILLNESS:     Oncology treatment Summary:    1.  February 2002 right breast mastectomy T1 cN0 M0 infiltrating ductal carcinoma ER/OK positive  2.  Left prophylactic mastectomy negative  3.  Adriamycin Cytoxan completed in May 2002  4.  Tamoxifen for 5 years completing in May 2007  5.  Status post CIRO/BSO    Interval history: Rosalinda returns today for follow-up.  She is clinically doing well.   Mom passed away in August.        REVIEW OF SYSTEMS:   14 point ROS was reviewed and is negative other than as noted above in HPI.       HOME MEDICATIONS:  Current Outpatient Medications   Medication Sig Dispense Refill     CALCIUM CARBONATE (CALCIUM 500 ORAL) [CALCIUM CARBONATE (CALCIUM 500 ORAL)] Take by mouth.       cholecalciferol, vitamin D3, (VITAMIN D3) 1,000 unit capsule [CHOLECALCIFEROL, VITAMIN D3, (VITAMIN D3) 1,000 UNIT CAPSULE] Take 1,000 Units by mouth daily.       EPINEPHrine (ANY BX GENERIC EQUIV) 0.3 MG/0.3ML injection 2-pack Inject 0.3 mLs (0.3 mg) into the muscle once as needed 2 each 6     gabapentin (NEURONTIN) 100 MG capsule PLEASE SEE ATTACHED FOR DETAILED DIRECTIONS (Patient not taking: Reported on 1/17/2024)       lactulose (CHRONULAC) 10 GM/15ML solution Take 15 mLs (10 g) by mouth daily as needed for constipation (Patient not taking: Reported on 1/17/2024) 237 mL 1         ALLERGIES:  Allergies   Allergen Reactions     Venom-Honey Bee [Bee Venom] Anaphylaxis     Benadryl [Diphenhydramine]  Other (See Comments)     IV, painful at site of IV.      Contrast Dye Unknown     Contrast [Iohexol] Unknown         PAST MEDICAL HISTORY:  Past Medical History:   Diagnosis Date     Breast cancer (H)          PAST SURGICAL HISTORY:  Past Surgical History:   Procedure Laterality Date     LAPAROSCOPIC HYSTERECTOMY TOTAL      for precancerous cells uterine     LAPAROSCOPIC HYSTERECTOMY TOTAL Bilateral      MASTECTOMY Bilateral      OTHER SURGICAL HISTORY Bilateral 2002    mastectomy         SOCIAL HISTORY:  Social History     Socioeconomic History     Marital status:      Spouse name: Not on file     Number of children: Not on file     Years of education: Not on file     Highest education level: Not on file   Occupational History     Not on file   Tobacco Use     Smoking status: Never     Smokeless tobacco: Never   Substance and Sexual Activity     Alcohol use: Not on file     Drug use: Never     Sexual activity: Not on file   Other Topics Concern     Not on file   Social History Narrative     Not on file     Social Determinants of Health     Financial Resource Strain: Not on file   Food Insecurity: Not on file   Transportation Needs: Not on file   Physical Activity: Not on file   Stress: Not on file   Social Connections: Not on file   Interpersonal Safety: Not on file   Housing Stability: Not on file         FAMILY HISTORY:  No change in family history last testing was in 2016 and negative    PHYSICAL EXAM:  Vital signs:  Wt 64 kg (141 lb)   BMI 23.46 kg/m     ECO     GENERAL/CONSTITUTIONAL: No acute distress. Healthy, alert.  EYES: No scleral icterus.  No redness or discharge.    RESPIRATORY: No audible wheeze, cough, or visible cyanosis.  No visible retractions or increased work of breathing.  Able to speak fully in complete sentences.  MUSCULOSKELETAL: Normal range of motion.  NEUROLOGIC: Alert, oriented, answers questions appropriately. No tremor. Mentation intact and speech  normal  INTEGUMENTARY: No jaundice.  No obvious rash or skin lesions.  PSYCHIATRIC:  Mentation appears normal, affect normal/bright, judgement and insight intact, normal speech and appearance well-groomed.    The rest of a comprehensive physical exam is deferred due to public health emergency video visit restrictions.     LABS:  reviewed    PATHOLOGY:  As per HPI    IMAGING:  None    ASSESSMENT/PLAN:  Rosalinda is a very pleasant 58 year old female with history of breast cancer almost 20 years ago    Her bone density scan suggests osteopenia now on her osteoporosis however based on the reading it appears that the bone density scan is worse we will have the radiologist take a questionable get that and see if that was an error and add an addendum because her T-scores look like they are improving    1 breast cancer: cured. No need for further follow up but patient prefers it so will continue yearly  2 DEXA in 2025 December .  Will order after next visit     Total time spent on day of visit, including review of tests, obtaining/reviewing separately obtained history, ordering medications/tests/procedures, communicating with PCP/consultants, and documenting in electronic medical record: 30 minutes    Ildefonso Stevens MD  Hematology/Oncology  [facility]      Again, thank you for allowing me to participate in the care of your patient.        Sincerely,        Ildefonso Stevens MD

## 2024-02-06 NOTE — NURSING NOTE
Is the patient currently in the state of [place]? YES    Visit mode:TELEPHONE    If the visit is dropped, the patient can be reconnected by: TELEPHONE VISIT: Phone number: [phone]    Will anyone else be joining the visit? NO  (If patient encounters technical issues they should call [phone]    How would you like to obtain your AVS? MyChart    Are changes needed to the allergy or medication list? Pt stated no changes to allergies and Pt stated no med changes    Reason for visit: GIANA Bryant LPN

## 2024-02-06 NOTE — PROGRESS NOTES
[facility]    Hematology/Oncology return pt note     Today's Date: Feb 6, 2024      Reason for Consult: Breast cancer      HISTORY OF PRESENT ILLNESS:     Oncology treatment Summary:    1.  February 2002 right breast mastectomy T1 cN0 M0 infiltrating ductal carcinoma ER/SC positive  2.  Left prophylactic mastectomy negative  3.  Adriamycin Cytoxan completed in May 2002  4.  Tamoxifen for 5 years completing in May 2007  5.  Status post CIRO/BSO    Interval history: Roaslinda returns today for follow-up.  She is clinically doing well.   Mom passed away in August.        REVIEW OF SYSTEMS:   14 point ROS was reviewed and is negative other than as noted above in HPI.       HOME MEDICATIONS:  Current Outpatient Medications   Medication Sig Dispense Refill    CALCIUM CARBONATE (CALCIUM 500 ORAL) [CALCIUM CARBONATE (CALCIUM 500 ORAL)] Take by mouth.      cholecalciferol, vitamin D3, (VITAMIN D3) 1,000 unit capsule [CHOLECALCIFEROL, VITAMIN D3, (VITAMIN D3) 1,000 UNIT CAPSULE] Take 1,000 Units by mouth daily.      EPINEPHrine (ANY BX GENERIC EQUIV) 0.3 MG/0.3ML injection 2-pack Inject 0.3 mLs (0.3 mg) into the muscle once as needed 2 each 6    gabapentin (NEURONTIN) 100 MG capsule PLEASE SEE ATTACHED FOR DETAILED DIRECTIONS (Patient not taking: Reported on 1/17/2024)      lactulose (CHRONULAC) 10 GM/15ML solution Take 15 mLs (10 g) by mouth daily as needed for constipation (Patient not taking: Reported on 1/17/2024) 237 mL 1         ALLERGIES:  Allergies   Allergen Reactions    Venom-Honey Bee [Bee Venom] Anaphylaxis    Benadryl [Diphenhydramine] Other (See Comments)     IV, painful at site of IV.     Contrast Dye Unknown    Contrast [Iohexol] Unknown         PAST MEDICAL HISTORY:  Past Medical History:   Diagnosis Date    Breast cancer (H)          PAST SURGICAL HISTORY:  Past Surgical History:   Procedure Laterality Date    LAPAROSCOPIC HYSTERECTOMY TOTAL      for precancerous cells uterine     LAPAROSCOPIC HYSTERECTOMY TOTAL Bilateral     MASTECTOMY Bilateral     OTHER SURGICAL HISTORY Bilateral 2002    mastectomy         SOCIAL HISTORY:  Social History     Socioeconomic History    Marital status:      Spouse name: Not on file    Number of children: Not on file    Years of education: Not on file    Highest education level: Not on file   Occupational History    Not on file   Tobacco Use    Smoking status: Never    Smokeless tobacco: Never   Substance and Sexual Activity    Alcohol use: Not on file    Drug use: Never    Sexual activity: Not on file   Other Topics Concern    Not on file   Social History Narrative    Not on file     Social Determinants of Health     Financial Resource Strain: Not on file   Food Insecurity: Not on file   Transportation Needs: Not on file   Physical Activity: Not on file   Stress: Not on file   Social Connections: Not on file   Interpersonal Safety: Not on file   Housing Stability: Not on file         FAMILY HISTORY:  No change in family history last testing was in 2016 and negative    PHYSICAL EXAM:  Vital signs:  Wt 64 kg (141 lb)   BMI 23.46 kg/m     ECO     GENERAL/CONSTITUTIONAL: No acute distress. Healthy, alert.  EYES: No scleral icterus.  No redness or discharge.    RESPIRATORY: No audible wheeze, cough, or visible cyanosis.  No visible retractions or increased work of breathing.  Able to speak fully in complete sentences.  MUSCULOSKELETAL: Normal range of motion.  NEUROLOGIC: Alert, oriented, answers questions appropriately. No tremor. Mentation intact and speech normal  INTEGUMENTARY: No jaundice.  No obvious rash or skin lesions.  PSYCHIATRIC:  Mentation appears normal, affect normal/bright, judgement and insight intact, normal speech and appearance well-groomed.    The rest of a comprehensive physical exam is deferred due to public health emergency video visit restrictions.     LABS:  reviewed    PATHOLOGY:  As per  HPI    IMAGING:  None    ASSESSMENT/PLAN:  Rosalinda is a very pleasant 58 year old female with history of breast cancer almost 20 years ago    Her bone density scan suggests osteopenia now on her osteoporosis however based on the reading it appears that the bone density scan is worse we will have the radiologist take a questionable get that and see if that was an error and add an addendum because her T-scores look like they are improving    1 breast cancer: cured. No need for further follow up but patient prefers it so will continue yearly  2 DEXA in 2025 December .  Will order after next visit     Lots of questions answers     Total time spent on day of visit, including review of tests, obtaining/reviewing separately obtained history, ordering medications/tests/procedures, communicating with PCP/consultants, and documenting in electronic medical record: 40  minutes    Ildefonso Stevens MD  Hematology/Oncology  [facility]

## 2024-02-06 NOTE — LETTER
2/6/2024         RE: Rosalinda Garcia  [address] [address]        Dear Colleague,    Thank you for referring your patient, Rosalinda Garcia, to the [facility]. Please see a copy of my visit note below.    Virtual Visit Details    Type of service:  Telephone Visit   Phone call duration: 20 minutes     [facility]    Hematology/Oncology return pt note     Today's Date: Feb 6, 2024      Reason for Consult: Breast cancer      HISTORY OF PRESENT ILLNESS:     Oncology treatment Summary:    1.  February 2002 right breast mastectomy T1 cN0 M0 infiltrating ductal carcinoma ER/ME positive  2.  Left prophylactic mastectomy negative  3.  Adriamycin Cytoxan completed in May 2002  4.  Tamoxifen for 5 years completing in May 2007  5.  Status post CIRO/BSO    Interval history: Rosalinda returns today for follow-up.  She is clinically doing well.   Mom passed away in August.        REVIEW OF SYSTEMS:   14 point ROS was reviewed and is negative other than as noted above in HPI.       HOME MEDICATIONS:  Current Outpatient Medications   Medication Sig Dispense Refill     CALCIUM CARBONATE (CALCIUM 500 ORAL) [CALCIUM CARBONATE (CALCIUM 500 ORAL)] Take by mouth.       cholecalciferol, vitamin D3, (VITAMIN D3) 1,000 unit capsule [CHOLECALCIFEROL, VITAMIN D3, (VITAMIN D3) 1,000 UNIT CAPSULE] Take 1,000 Units by mouth daily.       EPINEPHrine (ANY BX GENERIC EQUIV) 0.3 MG/0.3ML injection 2-pack Inject 0.3 mLs (0.3 mg) into the muscle once as needed 2 each 6     gabapentin (NEURONTIN) 100 MG capsule PLEASE SEE ATTACHED FOR DETAILED DIRECTIONS (Patient not taking: Reported on 1/17/2024)       lactulose (CHRONULAC) 10 GM/15ML solution Take 15 mLs (10 g) by mouth daily as needed for constipation (Patient not taking: Reported on 1/17/2024) 237 mL 1         ALLERGIES:  Allergies   Allergen Reactions     Venom-Honey Bee [Bee Venom] Anaphylaxis     Benadryl [Diphenhydramine]  Other (See Comments)     IV, painful at site of IV.      Contrast Dye Unknown     Contrast [Iohexol] Unknown         PAST MEDICAL HISTORY:  Past Medical History:   Diagnosis Date     Breast cancer (H)          PAST SURGICAL HISTORY:  Past Surgical History:   Procedure Laterality Date     LAPAROSCOPIC HYSTERECTOMY TOTAL      for precancerous cells uterine     LAPAROSCOPIC HYSTERECTOMY TOTAL Bilateral      MASTECTOMY Bilateral      OTHER SURGICAL HISTORY Bilateral 2002    mastectomy         SOCIAL HISTORY:  Social History     Socioeconomic History     Marital status:      Spouse name: Not on file     Number of children: Not on file     Years of education: Not on file     Highest education level: Not on file   Occupational History     Not on file   Tobacco Use     Smoking status: Never     Smokeless tobacco: Never   Substance and Sexual Activity     Alcohol use: Not on file     Drug use: Never     Sexual activity: Not on file   Other Topics Concern     Not on file   Social History Narrative     Not on file     Social Determinants of Health     Financial Resource Strain: Not on file   Food Insecurity: Not on file   Transportation Needs: Not on file   Physical Activity: Not on file   Stress: Not on file   Social Connections: Not on file   Interpersonal Safety: Not on file   Housing Stability: Not on file         FAMILY HISTORY:  No change in family history last testing was in 2016 and negative    PHYSICAL EXAM:  Vital signs:  Wt 64 kg (141 lb)   BMI 23.46 kg/m     ECO     GENERAL/CONSTITUTIONAL: No acute distress. Healthy, alert.  EYES: No scleral icterus.  No redness or discharge.    RESPIRATORY: No audible wheeze, cough, or visible cyanosis.  No visible retractions or increased work of breathing.  Able to speak fully in complete sentences.  MUSCULOSKELETAL: Normal range of motion.  NEUROLOGIC: Alert, oriented, answers questions appropriately. No tremor. Mentation intact and speech  normal  INTEGUMENTARY: No jaundice.  No obvious rash or skin lesions.  PSYCHIATRIC:  Mentation appears normal, affect normal/bright, judgement and insight intact, normal speech and appearance well-groomed.    The rest of a comprehensive physical exam is deferred due to public health emergency video visit restrictions.     LABS:  reviewed    PATHOLOGY:  As per HPI    IMAGING:  None    ASSESSMENT/PLAN:  Rosalinda is a very pleasant 58 year old female with history of breast cancer almost 20 years ago    Her bone density scan suggests osteopenia now on her osteoporosis however based on the reading it appears that the bone density scan is worse we will have the radiologist take a questionable get that and see if that was an error and add an addendum because her T-scores look like they are improving    1 breast cancer: cured. No need for further follow up but patient prefers it so will continue yearly  2 DEXA in 2025 December .  Will order after next visit     Total time spent on day of visit, including review of tests, obtaining/reviewing separately obtained history, ordering medications/tests/procedures, communicating with PCP/consultants, and documenting in electronic medical record: 30 minutes    Ildefonso Stevens MD  Hematology/Oncology  [facility]      Again, thank you for allowing me to participate in the care of your patient.        Sincerely,        Ildefonso Stevens MD

## 2024-02-12 DIAGNOSIS — Z90.13 ABSENCE OF BOTH BREASTS: Primary | ICD-10-CM

## 2024-03-15 ENCOUNTER — TELEPHONE (OUTPATIENT)
Dept: ONCOLOGY | Facility: CLINIC | Age: 59
End: 2024-03-15
Payer: COMMERCIAL

## 2024-03-15 NOTE — TELEPHONE ENCOUNTER
Called patient back after message that was left on voicemail requesting  prior authorization from her insurance for Breast Prosthesis/Mastectomy Bra. Patient had left message with her insurance company. Patient was fitted through [employer]. Stated to patient that [employer]  would follow up with prior authorization that is needed. Patient stated that [facility] received notice that her insurance would not cover. Requested she she had contact information through [email or web address] and she stated she did not have contact information.     Requested patient to call insurance with reference number information as writer needs more information to obtain an authorization. Charlotte Obando RN,BSN,OCN,CBCN

## 2024-03-19 ENCOUNTER — TELEPHONE (OUTPATIENT)
Dept: ONCOLOGY | Facility: CLINIC | Age: 59
End: 2024-03-19
Payer: COMMERCIAL

## 2024-03-19 NOTE — TELEPHONE ENCOUNTER
3 way call was placed with patient, patient contacted her [employer] insurance. Physician's NPI and CPT code were given for Breast Prosthesis/Mastectomy Bra. [employer] representative stated that prior authorization is not required. Reference number was given of [id number]. Patient will follow up with writer to make sure that his has been reimbursed by her [employer] insurance. Charlotte Obando RN,BSN,OCN,CBCN

## 2024-06-17 ENCOUNTER — TELEPHONE (OUTPATIENT)
Dept: ONCOLOGY | Facility: CLINIC | Age: 59
End: 2024-06-17
Payer: COMMERCIAL

## 2024-06-18 NOTE — TELEPHONE ENCOUNTER
Latter has been written and faxed, e mailed and mailed to patient. Charlotte Obando RN,BSN,OCN,CBCN

## 2024-08-16 ENCOUNTER — HOSPITAL ENCOUNTER (OUTPATIENT)
Facility: CLINIC | Age: 59
End: 2024-08-16
Attending: OPHTHALMOLOGY | Admitting: OPHTHALMOLOGY
Payer: COMMERCIAL

## 2024-10-06 ENCOUNTER — TELEPHONE (OUTPATIENT)
Dept: ONCOLOGY | Facility: CLINIC | Age: 59
End: 2024-10-06
Payer: COMMERCIAL

## 2024-10-06 NOTE — TELEPHONE ENCOUNTER
Called [employer] left message on their voice mail for them to call back regarding codes needed for reimbursement of Prosthesis and mastectomy  Bra. Charlotte Obando RN,BSN,OCN,CBCN

## 2024-10-11 ENCOUNTER — PATIENT OUTREACH (OUTPATIENT)
Dept: CARE COORDINATION | Facility: CLINIC | Age: 59
End: 2024-10-11
Payer: COMMERCIAL

## 2024-10-17 NOTE — TELEPHONE ENCOUNTER
Writer did not receive return message from [employer]. Called [employer] at [phone] with reference number [id number]. Request is regarding reimbursement for Breast Prosthesis/Mastectomy Bra. Writer was put on hold for 20 minutes then left message with representative to call back as patient stated that codes are needed for reimbursement. Charlotte Obando RN,BSN,OCN,CBCN

## 2024-10-25 ENCOUNTER — PATIENT OUTREACH (OUTPATIENT)
Dept: CARE COORDINATION | Facility: CLINIC | Age: 59
End: 2024-10-25
Payer: COMMERCIAL

## 2024-10-29 ENCOUNTER — TELEPHONE (OUTPATIENT)
Dept: ONCOLOGY | Facility: CLINIC | Age: 59
End: 2024-10-29
Payer: COMMERCIAL

## 2024-10-29 NOTE — TELEPHONE ENCOUNTER
Called  patient regarding  reimbursement of her Breast Prosthesis/Mastectomy Bra.     Writer will contact  [facility] at [phone] with reference number [id number]. Patient asked if letter could be resent with diagnosis code.              Contacted [employer] regarding reimbursement for her Breast Prosthesis/ Mastectomy Bra. [facility] was given clinic tax ID and Dr. Holloway's NPI number. [employer] stated that patient needs to send them the claim for the Breast Prosthesis/Mastectomy Bra to Claim # [id number], [facility] [employer], [address] [address] [place] [address], new reference # [id number].     Writer will send this information via [employer] so that claim can be mailed to [employer]. Charlotte Obando RN,BSN,OCN,CBCN

## 2025-01-08 ENCOUNTER — APPOINTMENT (OUTPATIENT)
Dept: URBAN - METROPOLITAN AREA CLINIC 260 | Age: 60
Setting detail: DERMATOLOGY
End: 2025-01-08

## 2025-01-08 VITALS — HEIGHT: 65 IN | RESPIRATION RATE: 14 BRPM | WEIGHT: 293 LBS

## 2025-01-08 DIAGNOSIS — D22 MELANOCYTIC NEVI: ICD-10-CM

## 2025-01-08 DIAGNOSIS — L82.1 OTHER SEBORRHEIC KERATOSIS: ICD-10-CM

## 2025-01-08 DIAGNOSIS — L81.4 OTHER MELANIN HYPERPIGMENTATION: ICD-10-CM

## 2025-01-08 DIAGNOSIS — D18.0 HEMANGIOMA: ICD-10-CM

## 2025-01-08 DIAGNOSIS — Z71.89 OTHER SPECIFIED COUNSELING: ICD-10-CM

## 2025-01-08 PROBLEM — D22.5 MELANOCYTIC NEVI OF TRUNK: Status: ACTIVE | Noted: 2025-01-08

## 2025-01-08 PROBLEM — D18.01 HEMANGIOMA OF SKIN AND SUBCUTANEOUS TISSUE: Status: ACTIVE | Noted: 2025-01-08

## 2025-01-08 PROCEDURE — 99203 OFFICE O/P NEW LOW 30 MIN: CPT

## 2025-01-08 PROCEDURE — OTHER MIPS QUALITY: OTHER

## 2025-01-08 PROCEDURE — OTHER COUNSELING: OTHER

## 2025-01-08 ASSESSMENT — LOCATION SIMPLE DESCRIPTION DERM
LOCATION SIMPLE: RIGHT FOREHEAD
LOCATION SIMPLE: LOWER BACK
LOCATION SIMPLE: UPPER BACK

## 2025-01-08 ASSESSMENT — LOCATION ZONE DERM
LOCATION ZONE: FACE
LOCATION ZONE: TRUNK

## 2025-01-08 ASSESSMENT — LOCATION DETAILED DESCRIPTION DERM
LOCATION DETAILED: INFERIOR THORACIC SPINE
LOCATION DETAILED: RIGHT SUPERIOR FOREHEAD
LOCATION DETAILED: SUPERIOR LUMBAR SPINE

## 2025-01-08 NOTE — HPI: FULL BODY SKIN EXAMINATION
What Type Of Note Output Would You Prefer (Optional)?: Standard Output
What Is The Reason For Today's Visit?: Full Body Skin Examination with No Concerns
What Is The Reason For Today's Visit? (Being Monitored For X): concerning skin lesions on an annual basis
Additional History: Patient reports no spots on concern but she does have a couple of spots on her forehead that she would like taken off if possible.

## 2025-01-14 ENCOUNTER — LAB (OUTPATIENT)
Dept: LAB | Facility: CLINIC | Age: 60
End: 2025-01-14
Payer: COMMERCIAL

## 2025-01-14 DIAGNOSIS — C50.011 MALIGNANT NEOPLASM OF NIPPLE OF RIGHT BREAST IN FEMALE, UNSPECIFIED ESTROGEN RECEPTOR STATUS (H): ICD-10-CM

## 2025-01-14 LAB
BASOPHILS # BLD AUTO: 0 10E3/UL (ref 0–0.2)
BASOPHILS NFR BLD AUTO: 0 %
EOSINOPHIL # BLD AUTO: 0.1 10E3/UL (ref 0–0.7)
EOSINOPHIL NFR BLD AUTO: 1 %
ERYTHROCYTE [DISTWIDTH] IN BLOOD BY AUTOMATED COUNT: 12.9 % (ref 10–15)
HCT VFR BLD AUTO: 40.8 % (ref 35–47)
HGB BLD-MCNC: 13.2 G/DL (ref 11.7–15.7)
IMM GRANULOCYTES # BLD: 0 10E3/UL
IMM GRANULOCYTES NFR BLD: 0 %
LYMPHOCYTES # BLD AUTO: 1.7 10E3/UL (ref 0.8–5.3)
LYMPHOCYTES NFR BLD AUTO: 35 %
MCH RBC QN AUTO: 30.9 PG (ref 26.5–33)
MCHC RBC AUTO-ENTMCNC: 32.4 G/DL (ref 31.5–36.5)
MCV RBC AUTO: 96 FL (ref 78–100)
MONOCYTES # BLD AUTO: 0.4 10E3/UL (ref 0–1.3)
MONOCYTES NFR BLD AUTO: 7 %
NEUTROPHILS # BLD AUTO: 2.7 10E3/UL (ref 1.6–8.3)
NEUTROPHILS NFR BLD AUTO: 56 %
PLATELET # BLD AUTO: 232 10E3/UL (ref 150–450)
RBC # BLD AUTO: 4.27 10E6/UL (ref 3.8–5.2)
WBC # BLD AUTO: 4.8 10E3/UL (ref 4–11)

## 2025-01-14 PROCEDURE — 36415 COLL VENOUS BLD VENIPUNCTURE: CPT

## 2025-01-14 PROCEDURE — 85025 COMPLETE CBC W/AUTO DIFF WBC: CPT

## 2025-01-14 PROCEDURE — 86300 IMMUNOASSAY TUMOR CA 15-3: CPT

## 2025-01-14 PROCEDURE — 80053 COMPREHEN METABOLIC PANEL: CPT

## 2025-01-14 PROCEDURE — 82378 CARCINOEMBRYONIC ANTIGEN: CPT

## 2025-01-15 LAB
ALBUMIN SERPL BCG-MCNC: 4.3 G/DL (ref 3.5–5.2)
ALP SERPL-CCNC: 51 U/L (ref 40–150)
ALT SERPL W P-5'-P-CCNC: 15 U/L (ref 0–50)
ANION GAP SERPL CALCULATED.3IONS-SCNC: 8 MMOL/L (ref 7–15)
AST SERPL W P-5'-P-CCNC: 20 U/L (ref 0–45)
BILIRUB SERPL-MCNC: 0.3 MG/DL
BUN SERPL-MCNC: 19.9 MG/DL (ref 8–23)
CALCIUM SERPL-MCNC: 9.6 MG/DL (ref 8.8–10.4)
CANCER AG15-3 SERPL-ACNC: 16 U/ML
CEA SERPL-MCNC: 1.3 NG/ML
CHLORIDE SERPL-SCNC: 102 MMOL/L (ref 98–107)
CREAT SERPL-MCNC: 0.79 MG/DL (ref 0.51–0.95)
EGFRCR SERPLBLD CKD-EPI 2021: 85 ML/MIN/1.73M2
GLUCOSE SERPL-MCNC: 84 MG/DL (ref 70–99)
HCO3 SERPL-SCNC: 29 MMOL/L (ref 22–29)
POTASSIUM SERPL-SCNC: 4.1 MMOL/L (ref 3.4–5.3)
PROT SERPL-MCNC: 7.2 G/DL (ref 6.4–8.3)
SODIUM SERPL-SCNC: 139 MMOL/L (ref 135–145)

## 2025-01-21 ENCOUNTER — PATIENT OUTREACH (OUTPATIENT)
Dept: CARE COORDINATION | Facility: CLINIC | Age: 60
End: 2025-01-21

## 2025-01-21 ENCOUNTER — ONCOLOGY VISIT (OUTPATIENT)
Dept: ONCOLOGY | Facility: CLINIC | Age: 60
End: 2025-01-21
Attending: INTERNAL MEDICINE
Payer: COMMERCIAL

## 2025-01-21 VITALS
HEART RATE: 70 BPM | BODY MASS INDEX: 24.66 KG/M2 | OXYGEN SATURATION: 98 % | WEIGHT: 148 LBS | DIASTOLIC BLOOD PRESSURE: 78 MMHG | SYSTOLIC BLOOD PRESSURE: 114 MMHG | HEIGHT: 65 IN | RESPIRATION RATE: 16 BRPM

## 2025-01-21 DIAGNOSIS — G62.9 NEUROPATHY: ICD-10-CM

## 2025-01-21 DIAGNOSIS — C50.011 MALIGNANT NEOPLASM OF NIPPLE OF RIGHT BREAST IN FEMALE, UNSPECIFIED ESTROGEN RECEPTOR STATUS (H): ICD-10-CM

## 2025-01-21 DIAGNOSIS — C50.011 MALIGNANT NEOPLASM OF NIPPLE OF RIGHT BREAST IN FEMALE, UNSPECIFIED ESTROGEN RECEPTOR STATUS (H): Primary | ICD-10-CM

## 2025-01-21 DIAGNOSIS — Z71.89 COORDINATION OF COMPLEX CARE: Primary | ICD-10-CM

## 2025-01-21 DIAGNOSIS — E84.0 CYSTIC FIBROSIS OF THE LUNG (H): ICD-10-CM

## 2025-01-21 PROCEDURE — 99214 OFFICE O/P EST MOD 30 MIN: CPT | Performed by: INTERNAL MEDICINE

## 2025-01-21 PROCEDURE — 99213 OFFICE O/P EST LOW 20 MIN: CPT | Performed by: INTERNAL MEDICINE

## 2025-01-21 PROCEDURE — G2211 COMPLEX E/M VISIT ADD ON: HCPCS | Performed by: INTERNAL MEDICINE

## 2025-01-21 ASSESSMENT — PAIN SCALES - GENERAL: PAINLEVEL_OUTOF10: NO PAIN (0)

## 2025-01-21 NOTE — LETTER
"1/21/2025      Rosalinda Garcia  [address] [address]      Dear Colleague,    Thank you for referring your patient, Rosalinda Garcia, to the [facility]. Please see a copy of my visit note below.    Oncology Rooming Note    January 21, 2025 9:38 AM   Rosalinda Garcia is a 59 year old female who presents for:    Chief Complaint   Patient presents with     Oncology Clinic Visit     Initial Vitals: /78 (BP Location: Left arm, Patient Position: Sitting, Cuff Size: Adult Regular)   Pulse 70   Resp 16   Ht 1.651 m (5' 5\")   Wt 67.1 kg (148 lb)   SpO2 98%   BMI 24.63 kg/m   Estimated body mass index is 24.63 kg/m  as calculated from the following:    Height as of this encounter: 1.651 m (5' 5\").    Weight as of this encounter: 67.1 kg (148 lb). Body surface area is 1.75 meters squared.  No Pain (0) Comment: Data Unavailable   No LMP recorded. Patient has had a hysterectomy.  Allergies reviewed: Yes  Medications reviewed: Yes    Medications: Medication refills not needed today.  Pharmacy name entered into [employer]:    [id number] - [place], [place] - [address]  [id number] [place], [place] - [address]    Frailty Screening:   Is the patient here for a new oncology consult visit in cancer care? 2. No      Clinical concerns: None       Joseline Yang MA               [facility]    Hematology/Oncology return pt note     Today's Date: Jan 21, 2025      Reason for Consult: Breast cancer      HISTORY OF PRESENT ILLNESS:     Oncology treatment Summary:    1.  February 2002 right breast mastectomy T1 cN0 M0 infiltrating ductal carcinoma ER/OH positive  2.  Left prophylactic mastectomy negative  3.  Adriamycin Cytoxan completed in May 2002  4.  Tamoxifen for 5 years completing in May 2007  5.  Status post CIRO/BSO    Interval history: Rosalinda returns today for follow-up.  She is " clinically doing well.   Father passed away in October. Due for colonoscopy this year      REVIEW OF SYSTEMS:   14 point ROS was reviewed and is negative other than as noted above in HPI.       HOME MEDICATIONS:  Current Outpatient Medications   Medication Sig Dispense Refill     CALCIUM CARBONATE (CALCIUM 500 ORAL) [CALCIUM CARBONATE (CALCIUM 500 ORAL)] Take by mouth.       cholecalciferol, vitamin D3, (VITAMIN D3) 1,000 unit capsule [CHOLECALCIFEROL, VITAMIN D3, (VITAMIN D3) 1,000 UNIT CAPSULE] Take 1,000 Units by mouth daily.       EPINEPHrine (ANY BX GENERIC EQUIV) 0.3 MG/0.3ML injection 2-pack Inject 0.3 mLs (0.3 mg) into the muscle once as needed 2 each 6     lactulose (CHRONULAC) 10 GM/15ML solution Take 15 mLs (10 g) by mouth daily as needed for constipation 237 mL 1     gabapentin (NEURONTIN) 100 MG capsule PLEASE SEE ATTACHED FOR DETAILED DIRECTIONS (Patient not taking: Reported on 1/21/2025)           ALLERGIES:  Allergies   Allergen Reactions     Venom-Honey Bee [Bee Venom] Anaphylaxis     Benadryl [Diphenhydramine] Other (See Comments)     IV, painful at site of IV.      Contrast Dye Unknown     Contrast [Iohexol] Unknown         PAST MEDICAL HISTORY:  Past Medical History:   Diagnosis Date     Breast cancer (H)          PAST SURGICAL HISTORY:  Past Surgical History:   Procedure Laterality Date     LAPAROSCOPIC HYSTERECTOMY TOTAL      for precancerous cells uterine     LAPAROSCOPIC HYSTERECTOMY TOTAL Bilateral      MASTECTOMY Bilateral      OTHER SURGICAL HISTORY Bilateral 01/01/2002    mastectomy         SOCIAL HISTORY:  Social History     Socioeconomic History     Marital status:      Spouse name: Not on file     Number of children: Not on file     Years of education: Not on file     Highest education level: Not on file   Occupational History     Not on file   Tobacco Use     Smoking status: Never     Smokeless tobacco: Never   Substance and Sexual Activity     Alcohol use: Not on file     Drug  "use: Never     Sexual activity: Not on file   Other Topics Concern     Not on file   Social History Narrative     Not on file     Social Drivers of Health     Financial Resource Strain: Not on file   Food Insecurity: Not on file   Transportation Needs: Not on file   Physical Activity: Not on file   Stress: Not on file   Social Connections: Not on file   Interpersonal Safety: Not on file   Housing Stability: Not on file         FAMILY HISTORY:   father unfortunately passed away from colon cancer last year       PHYSICAL EXAM:  Vital signs:  /78 (BP Location: Left arm, Patient Position: Sitting, Cuff Size: Adult Regular)   Pulse 70   Resp 16   Ht 1.651 m (5' 5\")   Wt 67.1 kg (148 lb)   SpO2 98%   BMI 24.63 kg/m     ECO     Status post bilateral mastectomies with redundant skin.  No evidence of recurrencereviewed    PATHOLOGY:  As per HPI    IMAGING:  None    Labs noted and reviewed in detail    ASSESSMENT/PLAN:  Rosalinda is a very pleasant 59 year old female with history of breast cancer almost 20 years ago    Her bone density scan suggests osteopenia now on her osteoporosis however based on the reading it appears that the bone density scan is worse we will have the radiologist take a questionable get that and see if that was an error and add an addendum because her T-scores look like they are improving    1 breast cancer: cured. No need for further follow up but patient prefers it so will continue yearly  2 DEXA in 2025 .  Will order  3 colonosocopy referral  4 gabapentin gel to post surgery area due to neuropathy    Lots of questions answers     Total time spent on day of visit, including review of tests, obtaining/reviewing separately obtained history, ordering medications/tests/procedures, communicating with PCP/consultants, and documenting in electronic medical record: 40  minutes    Ildefonso Stevens MD  Hematology/Oncology  [facility]        Again, thank you for " allowing me to participate in the care of your patient.        Sincerely,        Ildefonso Stevens MD    Electronically signed

## 2025-01-21 NOTE — PROGRESS NOTES
"Oncology Rooming Note    January 21, 2025 9:38 AM   Rosalinda Garcia is a 59 year old female who presents for:    Chief Complaint   Patient presents with    Oncology Clinic Visit     Initial Vitals: /78 (BP Location: Left arm, Patient Position: Sitting, Cuff Size: Adult Regular)   Pulse 70   Resp 16   Ht 1.651 m (5' 5\")   Wt 67.1 kg (148 lb)   SpO2 98%   BMI 24.63 kg/m   Estimated body mass index is 24.63 kg/m  as calculated from the following:    Height as of this encounter: 1.651 m (5' 5\").    Weight as of this encounter: 67.1 kg (148 lb). Body surface area is 1.75 meters squared.  No Pain (0) Comment: Data Unavailable   No LMP recorded. Patient has had a hysterectomy.  Allergies reviewed: Yes  Medications reviewed: Yes    Medications: Medication refills not needed today.  Pharmacy name entered into Opp.io:    [facility][id number] - [place], [place] - [address]  [id number] [place], [place] - [address]    Frailty Screening:   Is the patient here for a new oncology consult visit in cancer care? 2. No      Clinical concerns: None       Joseline Yang MA            "

## 2025-01-21 NOTE — PROGRESS NOTES
"Social Work - Intervention  [facility]    Data/Intervention:  Patient Name: Rosalinda Garcia Goes By: Kerrie    /Age: 1965 (59 year old)     Visit Type: telephone  Referral Source: Charlotte Obando RN  Reason for Referral: Mastectomy Bra & Breast Prosthetic Coverage     Psychosocial Information/Concerns:  Kerrie reports that she purchased $592.28 worth of product in  from [employer], where she has purchased her DME for the past 20 years. Kerrie reports that she learned that this purchase was denied due to [employer] being \"out of network\" with her [employer] insurance. Kerrie reports that oncology team has worked hard to appeal this decision.     Kerrie reports that it would not be a financial hardship for her to pay this outright, but does feel strongly that the health insurance pay this cost due to Women's Health and Cancer Rights act of 1998.     SW acknowledged that I do not have a funding source to offset these costs as Kerrie is not actively going through treatment, but that I would be happy to escalate to ambulatory care case management leadership to explore if there were any contacts within [employer] that would be appropriate to bring concerns to.      Assessment/Plan:  1) Onc SW escalated concern to ambulatory case management leadership  2) Onc SW will continue to be available as needed for ongoing psychosocial support.      Provided patient/family with contact information and availability.    MAL Mays, LICSW, OSW-C  Clinical - Adult Oncology  Phone: [phone]  She/Her/Hers  [facility]: Marie [address]  8am-4:30pm  [facility]: REBECCA Benedict F 8am-4:30pm   Support Groups at [facility]: Social Work Services for Cancer Patients ([email or web address])      "

## 2025-01-21 NOTE — PROGRESS NOTES
[facility]    Hematology/Oncology return pt note     Today's Date: Jan 21, 2025      Reason for Consult: Breast cancer      HISTORY OF PRESENT ILLNESS:     Oncology treatment Summary:    1.  February 2002 right breast mastectomy T1 cN0 M0 infiltrating ductal carcinoma ER/TX positive  2.  Left prophylactic mastectomy negative  3.  Adriamycin Cytoxan completed in May 2002  4.  Tamoxifen for 5 years completing in May 2007  5.  Status post CIRO/BSO    Interval history: Rosalinda returns today for follow-up.  She is clinically doing well.   Father passed away in October. Due for colonoscopy this year      REVIEW OF SYSTEMS:   14 point ROS was reviewed and is negative other than as noted above in HPI.       HOME MEDICATIONS:  Current Outpatient Medications   Medication Sig Dispense Refill    CALCIUM CARBONATE (CALCIUM 500 ORAL) [CALCIUM CARBONATE (CALCIUM 500 ORAL)] Take by mouth.      cholecalciferol, vitamin D3, (VITAMIN D3) 1,000 unit capsule [CHOLECALCIFEROL, VITAMIN D3, (VITAMIN D3) 1,000 UNIT CAPSULE] Take 1,000 Units by mouth daily.      EPINEPHrine (ANY BX GENERIC EQUIV) 0.3 MG/0.3ML injection 2-pack Inject 0.3 mLs (0.3 mg) into the muscle once as needed 2 each 6    lactulose (CHRONULAC) 10 GM/15ML solution Take 15 mLs (10 g) by mouth daily as needed for constipation 237 mL 1    gabapentin (NEURONTIN) 100 MG capsule PLEASE SEE ATTACHED FOR DETAILED DIRECTIONS (Patient not taking: Reported on 1/21/2025)           ALLERGIES:  Allergies   Allergen Reactions    Venom-Honey Bee [Bee Venom] Anaphylaxis    Benadryl [Diphenhydramine] Other (See Comments)     IV, painful at site of IV.     Contrast Dye Unknown    Contrast [Iohexol] Unknown         PAST MEDICAL HISTORY:  Past Medical History:   Diagnosis Date    Breast cancer (H)          PAST SURGICAL HISTORY:  Past Surgical History:   Procedure Laterality Date    LAPAROSCOPIC HYSTERECTOMY TOTAL      for precancerous cells uterine    LAPAROSCOPIC  "HYSTERECTOMY TOTAL Bilateral     MASTECTOMY Bilateral     OTHER SURGICAL HISTORY Bilateral 2002    mastectomy         SOCIAL HISTORY:  Social History     Socioeconomic History    Marital status:      Spouse name: Not on file    Number of children: Not on file    Years of education: Not on file    Highest education level: Not on file   Occupational History    Not on file   Tobacco Use    Smoking status: Never    Smokeless tobacco: Never   Substance and Sexual Activity    Alcohol use: Not on file    Drug use: Never    Sexual activity: Not on file   Other Topics Concern    Not on file   Social History Narrative    Not on file     Social Drivers of Health     Financial Resource Strain: Not on file   Food Insecurity: Not on file   Transportation Needs: Not on file   Physical Activity: Not on file   Stress: Not on file   Social Connections: Not on file   Interpersonal Safety: Not on file   Housing Stability: Not on file         FAMILY HISTORY:   father unfortunately passed away from colon cancer last year       PHYSICAL EXAM:  Vital signs:  /78 (BP Location: Left arm, Patient Position: Sitting, Cuff Size: Adult Regular)   Pulse 70   Resp 16   Ht 1.651 m (5' 5\")   Wt 67.1 kg (148 lb)   SpO2 98%   BMI 24.63 kg/m     ECO     Status post bilateral mastectomies with redundant skin.  No evidence of recurrencereviewed    PATHOLOGY:  As per HPI    IMAGING:  None    Labs noted and reviewed in detail    ASSESSMENT/PLAN:  Rosalinda is a very pleasant 59 year old female with history of breast cancer almost 20 years ago    Her bone density scan suggests osteopenia now on her osteoporosis however based on the reading it appears that the bone density scan is worse we will have the radiologist take a questionable get that and see if that was an error and add an addendum because her T-scores look like they are improving    1 breast cancer: cured. No need for further follow up but patient prefers it so will " continue yearly  2 DEXA in 2025 December .  Will order  3 colonosocopy referral  4 gabapentin gel to post surgery area due to neuropathy    Lots of questions answers     Total time spent on day of visit, including review of tests, obtaining/reviewing separately obtained history, ordering medications/tests/procedures, communicating with PCP/consultants, and documenting in electronic medical record: 40  minutes    Ildefonso Stevens MD  Hematology/Oncology  [facility]

## 2025-01-22 ENCOUNTER — OFFICE VISIT (OUTPATIENT)
Dept: FAMILY MEDICINE | Facility: CLINIC | Age: 60
End: 2025-01-22
Payer: COMMERCIAL

## 2025-01-22 VITALS
OXYGEN SATURATION: 99 % | HEIGHT: 65 IN | BODY MASS INDEX: 24.59 KG/M2 | SYSTOLIC BLOOD PRESSURE: 97 MMHG | TEMPERATURE: 98 F | RESPIRATION RATE: 16 BRPM | HEART RATE: 67 BPM | WEIGHT: 147.6 LBS | DIASTOLIC BLOOD PRESSURE: 59 MMHG

## 2025-01-22 DIAGNOSIS — Z13.6 SCREENING FOR HEART DISEASE: ICD-10-CM

## 2025-01-22 DIAGNOSIS — C50.011 MALIGNANT NEOPLASM OF NIPPLE OF RIGHT BREAST IN FEMALE, UNSPECIFIED ESTROGEN RECEPTOR STATUS (H): ICD-10-CM

## 2025-01-22 DIAGNOSIS — Z00.00 ROUTINE GENERAL MEDICAL EXAMINATION AT A HEALTH CARE FACILITY: Primary | ICD-10-CM

## 2025-01-22 DIAGNOSIS — Z13.220 LIPID SCREENING: ICD-10-CM

## 2025-01-22 DIAGNOSIS — Z90.13 H/O BILATERAL MASTECTOMY: ICD-10-CM

## 2025-01-22 DIAGNOSIS — Z91.030 BEE ALLERGY STATUS: ICD-10-CM

## 2025-01-22 DIAGNOSIS — M81.0 AGE RELATED OSTEOPOROSIS, UNSPECIFIED PATHOLOGICAL FRACTURE PRESENCE: ICD-10-CM

## 2025-01-22 DIAGNOSIS — G62.9 NEUROPATHY: ICD-10-CM

## 2025-01-22 DIAGNOSIS — Z90.710 H/O: HYSTERECTOMY: ICD-10-CM

## 2025-01-22 DIAGNOSIS — Z80.8 FAMILY HISTORY OF SKIN CANCER: ICD-10-CM

## 2025-01-22 DIAGNOSIS — Z85.3 HX: BREAST CANCER: ICD-10-CM

## 2025-01-22 PROCEDURE — 99396 PREV VISIT EST AGE 40-64: CPT | Performed by: FAMILY MEDICINE

## 2025-01-22 SDOH — HEALTH STABILITY: PHYSICAL HEALTH: ON AVERAGE, HOW MANY DAYS PER WEEK DO YOU ENGAGE IN MODERATE TO STRENUOUS EXERCISE (LIKE A BRISK WALK)?: 7 DAYS

## 2025-01-22 SDOH — HEALTH STABILITY: PHYSICAL HEALTH: ON AVERAGE, HOW MANY MINUTES DO YOU ENGAGE IN EXERCISE AT THIS LEVEL?: 60 MIN

## 2025-01-22 ASSESSMENT — SOCIAL DETERMINANTS OF HEALTH (SDOH): HOW OFTEN DO YOU GET TOGETHER WITH FRIENDS OR RELATIVES?: THREE TIMES A WEEK

## 2025-01-22 NOTE — PROGRESS NOTES
Preventive Care Visit  [facility]  Dorothy Ryan MD, Family Medicine  Jan 22, 2025      1. Routine general medical examination at a health care facility (Primary)  Kerrie comes in today for annual exam.  As far as healthcare maintenance, she was already given orders by her oncologist for colon cancer screening and her bone density.  She no longer needs Pap smears as she has had a total hysterectomy.  She no longer needs mammograms as she has had a bilateral mastectomy.  She did not want to do any vaccines today.    2. HX: breast cancer  This was 23 years ago.  She still sees her oncologist yearly.    3. H/O bilateral mastectomy  As above.    4. H/O: hysterectomy  Pap smears are not required.    5. Bee allergy status  Stable, has EpiPen.    6. Age related osteoporosis, unspecified pathological fracture presence  Has had some mild osteoporosis that is been followed by oncology.  We discussed potentially starting treatment and treatment options.  She will wait to see what her most recent bone density looks like.    7. Screening for heart disease  She is requesting a calcium CT score.  I discussed the limitations of this study.  She does not really have any risk factors for heart disease.  - CT Coronary Calcium Scan; Future    8. Lipid screening  Her last cholesterol was normal.  Will add this onto future labs when she next has her oncology labs.  - Lipid panel reflex to direct LDL Fasting; Future    9. Family history of skin cancer - father SCC, BCC  Her dad had squamous cell and basal cell.  No history of melanoma.  She has a skin screen yearly with dermatology now.      Subjective   Kerrie is a 59 year old, presenting for the following:  Physical (Not fasting. )        1/22/2025     9:21 AM   Additional Questions   Roomed by           HPI  She comes in today for annual exam.  Have just seen her for minor things but we have never done a physical for her.  She had breast cancer 23 years ago  and still follows with oncology.  Her concerns today are ridges of her nails and we discussed this is a normal finding and not much to do about it.  She said some itchiness of her skin and scalp.  She has been using over-the-counter hydrocortisone cream and has been helpful for the skin.  We discussed other treatment options for the scalp, but does not sound overly concerning.  She was given a prescription to gabapentin as they thought it could potentially be from nerves as it tends to correlate when she is cold.  She does want a calcium CT score.  She states her son wants her to get the screening done.  She has no history of diabetes, high cholesterol, hypertension, obesity.  Discussed the limitations of the test and that likely she will have to pay cash.  The longitudinal plan of care for the diagnosis(es)/condition(s) as documented were addressed during this visit. Due to the added complexity in care, I will continue to support Kerrie in the subsequent management and with ongoing continuity of care.         Health Care Directive  Patient does not have a Health Care Directive: Patient states has Advance Directive and will bring in a copy to clinic.      1/22/2025   General Health   How would you rate your overall physical health? Excellent   Feel stress (tense, anxious, or unable to sleep) Only a little   (!) STRESS CONCERN      1/22/2025   Nutrition   Three or more servings of calcium each day? Yes   Diet: Regular (no restrictions)   How many servings of fruit and vegetables per day? (!) 2-3   How many sweetened beverages each day? 0-1         1/22/2025   Exercise   Days per week of moderate/strenous exercise 7 days   Average minutes spent exercising at this level 60 min         1/22/2025   Social Factors   Frequency of gathering with friends or relatives Three times a week   Worry food won't last until get money to buy more No   Food not last or not have enough money for food? No   Do you have housing? (Housing is  defined as stable permanent housing and does not include staying ouside in a car, in a tent, in an abandoned building, in an overnight shelter, or couch-surfing.) Yes   Are you worried about losing your housing? No   Lack of transportation? No   Unable to get utilities (heat,electricity)? No         1/22/2025   Fall Risk   Fallen 2 or more times in the past year? No    Trouble with walking or balance? No        Proxy-reported          1/22/2025   Dental   Dentist two times every year? Yes         1/22/2025   TB Screening   Were you born outside of the [place]? No         Today's PHQ-2 Score:       1/22/2025     9:26 AM   PHQ-2 ( 1999 [employer])   Q1: Little interest or pleasure in doing things 0    Q2: Feeling down, depressed or hopeless 0    PHQ-2 Score 0    Q1: Little interest or pleasure in doing things Not at all   Q2: Feeling down, depressed or hopeless Not at all   PHQ-2 Score 0       Proxy-reported           1/22/2025   Substance Use   Alcohol more than 3/day or more than 7/wk No   Do you use any other substances recreationally? No     Social History     Tobacco Use    Smoking status: Never    Smokeless tobacco: Never   Vaping Use    Vaping status: Never Used   Substance Use Topics    Drug use: Never                     1/22/2025   One time HIV Screening   Previous HIV test? No         1/22/2025   STI Screening   New sexual partner(s) since last STI/HIV test? No     History of abnormal Pap smear: Status post hysterectomy with removal of cervix and no history of CIN2 or greater or cervical cancer. Health Maintenance and Surgical History updated.       ASCVD Risk   The 10-year ASCVD risk score (Kaela SWEENEY, et al., 2019) is: 1.3%    Values used to calculate the score:      Age: 59 years      Sex: Female      Is Non- : No      Diabetic: No      Tobacco smoker: No      Systolic Blood Pressure: 97 mmHg      Is BP treated: No      HDL Cholesterol: 89 mg/dL      Total Cholesterol: 184  "mg/dL           Reviewed and updated as needed this visit by Provider                    Lab work is in process  Current Outpatient Medications   Medication Sig Dispense Refill    CALCIUM CARBONATE (CALCIUM 500 ORAL) [CALCIUM CARBONATE (CALCIUM 500 ORAL)] Take by mouth.      cholecalciferol, vitamin D3, (VITAMIN D3) 1,000 unit capsule [CHOLECALCIFEROL, VITAMIN D3, (VITAMIN D3) 1,000 UNIT CAPSULE] Take 1,000 Units by mouth daily.      EPINEPHrine (ANY BX GENERIC EQUIV) 0.3 MG/0.3ML injection 2-pack Inject 0.3 mLs (0.3 mg) into the muscle once as needed 2 each 6    gabapentin 8%-lidocaine 2.5% in PLO cream Apply 240 clicks (60 g) topically 3 times daily. 60 g 1    lactulose (CHRONULAC) 10 GM/15ML solution Take 15 mLs (10 g) by mouth daily as needed for constipation 237 mL 1    gabapentin (NEURONTIN) 100 MG capsule PLEASE SEE ATTACHED FOR DETAILED DIRECTIONS (Patient not taking: Reported on 1/17/2024)           Review of Systems  Constitutional, HEENT, cardiovascular, pulmonary, gi and gu systems are negative, except as otherwise noted.     Objective    Exam  BP 97/59   Pulse 67   Temp 98  F (36.7  C)   Resp 16   Ht 1.657 m (5' 5.25\")   Wt 67 kg (147 lb 9.6 oz)   SpO2 99%   BMI 24.37 kg/m     Estimated body mass index is 24.37 kg/m  as calculated from the following:    Height as of this encounter: 1.657 m (5' 5.25\").    Weight as of this encounter: 67 kg (147 lb 9.6 oz).    Physical Exam  GENERAL: alert and no distress  EYES: Eyes grossly normal to inspection, PERRL and conjunctivae and sclerae normal  HENT: ear canals and TM's normal, nose and mouth without ulcers or lesions  NECK: no adenopathy, no asymmetry, masses, or scars  RESP: lungs clear to auscultation - no rales, rhonchi or wheezes  CV: regular rate and rhythm, normal S1 S2, no S3 or S4, no murmur, click or rub, no peripheral edema  ABDOMEN: soft, nontender, no hepatosplenomegaly, no masses and bowel sounds normal  MS: no gross musculoskeletal defects " noted, no edema  SKIN: no suspicious lesions or rashes  NEURO: Normal strength and tone, mentation intact and speech normal  PSYCH: mentation appears normal, affect normal/bright        Signed Electronically by: Dorothy Ryan MD

## 2025-01-22 NOTE — PATIENT INSTRUCTIONS
Patient Education   Preventive Care Advice   This is general advice given by our system to help you stay healthy. However, your care team may have specific advice just for you. Please talk to your care team about your preventive care needs.  Nutrition  Eat 5 or more servings of fruits and vegetables each day.  Try wheat bread, brown rice and whole grain pasta (instead of white bread, rice, and pasta).  Get enough calcium and vitamin D. Check the label on foods and aim for 100% of the RDA (recommended daily allowance).  Lifestyle  Exercise at least 150 minutes each week  (30 minutes a day, 5 days a week).  Do muscle strengthening activities 2 days a week. These help control your weight and prevent disease.  No smoking.  Wear sunscreen to prevent skin cancer.  Have a dental exam and cleaning every 6 months.  Yearly exams  See your health care team every year to talk about:  Any changes in your health.  Any medicines your care team has prescribed.  Preventive care, family planning, and ways to prevent chronic diseases.  Shots (vaccines)   HPV shots (up to age 26), if you've never had them before.  Hepatitis B shots (up to age 59), if you've never had them before.  COVID-19 shot: Get this shot when it's due.  Flu shot: Get a flu shot every year.  Tetanus shot: Get a tetanus shot every 10 years.  Pneumococcal, hepatitis A, and RSV shots: Ask your care team if you need these based on your risk.  Shingles shot (for age 50 and up)  General health tests  Diabetes screening:  Starting at age 35, Get screened for diabetes at least every 3 years.  If you are younger than age 35, ask your care team if you should be screened for diabetes.  Cholesterol test: At age 39, start having a cholesterol test every 5 years, or more often if advised.  Bone density scan (DEXA): At age 50, ask your care team if you should have this scan for osteoporosis (brittle bones).  Hepatitis C: Get tested at least once in your life.  STIs (sexually  transmitted infections)  Before age 24: Ask your care team if you should be screened for STIs.  After age 24: Get screened for STIs if you're at risk. You are at risk for STIs (including HIV) if:  You are sexually active with more than one person.  You don't use condoms every time.  You or a partner was diagnosed with a sexually transmitted infection.  If you are at risk for HIV, ask about PrEP medicine to prevent HIV.  Get tested for HIV at least once in your life, whether you are at risk for HIV or not.  Cancer screening tests  Cervical cancer screening: If you have a cervix, begin getting regular cervical cancer screening tests starting at age 21.  Breast cancer scan (mammogram): If you've ever had breasts, begin having regular mammograms starting at age 40. This is a scan to check for breast cancer.  Colon cancer screening: It is important to start screening for colon cancer at age 45.  Have a colonoscopy test every 10 years (or more often if you're at risk) Or, ask your provider about stool tests like a FIT test every year or Cologuard test every 3 years.  To learn more about your testing options, visit:   .  For help making a decision, visit:   https://bit.ly/gr34652.  Prostate cancer screening test: If you have a prostate, ask your care team if a prostate cancer screening test (PSA) at age 55 is right for you.  Lung cancer screening: If you are a current or former smoker ages 50 to 80, ask your care team if ongoing lung cancer screenings are right for you.  For informational purposes only. Not to replace the advice of your health care provider. Copyright   2023 [employer]. All rights reserved. Clinically reviewed by the [facility] Transitions Program. [employer] 141678 - REV 01/24.

## 2025-01-23 ENCOUNTER — TELEPHONE (OUTPATIENT)
Dept: ONCOLOGY | Facility: CLINIC | Age: 60
End: 2025-01-23
Payer: COMMERCIAL

## 2025-01-23 ENCOUNTER — PATIENT OUTREACH (OUTPATIENT)
Dept: CARE COORDINATION | Facility: CLINIC | Age: 60
End: 2025-01-23
Payer: COMMERCIAL

## 2025-01-23 NOTE — TELEPHONE ENCOUNTER
Called Kerrie left message on her voicemail that her Neurontin cream cannot be filled at [facility] pharmacy. It will need to be filled at a compounding pharmacy. Forwarded  to a [place] pharmacy. Charlotte Obando RN,BSN,OCN,CBCN

## 2025-01-24 ENCOUNTER — TELEPHONE (OUTPATIENT)
Dept: ONCOLOGY | Facility: CLINIC | Age: 60
End: 2025-01-24
Payer: COMMERCIAL

## 2025-01-24 NOTE — TELEPHONE ENCOUNTER
Good Morning,    We need clarification on a new script sent over for Rosalinda's compounded Gabapentin / Lidocaine PLO Crm. The script says to apply 240 clicks (60gm) topically TID with a dispensed quantity of 60gms ( one dose ). Please verify number of grams / clicks applied and the dispensed quantity and resend a corrected script if needed.    Thank you for your time.        Triny German  [facility]    [facility]  [address] [place] [address] 61  [email or web address]  [email or web address]  Phone: [phone]  Fax: [phone]

## 2025-02-03 ENCOUNTER — PATIENT OUTREACH (OUTPATIENT)
Dept: CARE COORDINATION | Facility: CLINIC | Age: 60
End: 2025-02-03
Payer: COMMERCIAL

## 2025-02-03 NOTE — PROGRESS NOTES
Social Work - Telephone/MyChart message  [facility]  Data:   Patient Name: Rosalinda Garcia  Goes By: Kerrie    /Age: 1965 (59 year old)      Reason for Referral: Follow-up regarding mastectomy bra and breast prosthetic coverage    Intervention: Kerrie reports that she received a call from [employer] and was connected with an advocacy group through [employer], submitted additional paperwork (EOB and invoice) and reports that she is anticipating to hear an outcome in 2-3 days from date of filing.      SW discussed upcoming [facility] Programming, and have added Kerrie to breast cancer listserv per her request.   Plan:  will continue to be available as needed.      MAL Mays, LICSW, OSW-C  Clinical - Adult Oncology  Phone: [phone]  She/Her/Hers  [facility]: [address] WAY  8am-4:30pm  [facility]: REBECCA Benedict F 8am-4:30pm   Support Groups at [facility]: Social Work Services for Cancer Patients ([email or web address])

## 2025-02-05 ENCOUNTER — HOSPITAL ENCOUNTER (OUTPATIENT)
Dept: CT IMAGING | Facility: CLINIC | Age: 60
Discharge: HOME | End: 2025-02-05
Attending: FAMILY MEDICINE
Payer: COMMERCIAL

## 2025-02-05 DIAGNOSIS — Z13.6 SCREENING FOR HEART DISEASE: ICD-10-CM

## 2025-02-05 PROCEDURE — 75571 CT HRT W/O DYE W/CA TEST: CPT

## 2025-02-05 PROCEDURE — 75571 CT HRT W/O DYE W/CA TEST: CPT | Mod: 26 | Performed by: STUDENT IN AN ORGANIZED HEALTH CARE EDUCATION/TRAINING PROGRAM

## 2025-02-06 LAB
CV CALCIUM SCORE AGATSTON LM: 0
CV CALCIUM SCORING AGATSON LAD: 0
CV CALCIUM SCORING AGATSTON CX: 0
CV CALCIUM SCORING AGATSTON RCA: 0
CV CALCIUM SCORING AGATSTON TOTAL: 0

## 2025-04-10 ENCOUNTER — PATIENT OUTREACH (OUTPATIENT)
Dept: CARE COORDINATION | Facility: CLINIC | Age: 60
End: 2025-04-10
Payer: COMMERCIAL

## 2025-04-10 NOTE — PROGRESS NOTES
Social Work Note - Incoming Email  [facility]    Data/Intervention:   Patient Name: Rosalinda Garcia Goes By: Kerrie    /Age: 1965 (59 year old)      Florencio From: Kerrie  Reason for Call: Mastectomy products    Assessment:   SW provided Kerrie with information for [employer]. Kerrie reported she continues to have issues with [employer], and requested call from RENZO Porter.      Plan: SW alerted Charlotte of pt request for call.       MAL Mays, [facility]  Clinical - Adult Oncology  Phone: [phone]  She/Her/Hers  [facility]: [address]  8am-4:30pm  [facility]: REBECCA Benedict F 8am-4:30pm   Support Groups at [facility]: Social Work Services for Cancer Patients ([email or web address])

## 2025-04-14 ENCOUNTER — TELEPHONE (OUTPATIENT)
Dept: ONCOLOGY | Facility: CLINIC | Age: 60
End: 2025-04-14
Payer: COMMERCIAL

## 2025-04-14 DIAGNOSIS — Z90.13 H/O BILATERAL MASTECTOMY: Primary | ICD-10-CM

## 2025-04-14 NOTE — TELEPHONE ENCOUNTER
Kerrie called clinic stating that she needs  an order for x3 mastectomy Bras sent to [facility]. Order will be written and faxed.     Regarding  Mstectomy Bra order from last year from Nate Kerrie requested another phone call to [employer] at [phone] requesting to speak with [facility] (provider service center) requesting a x1 out of network authorization for approval to [facility] for Durable Medical Equipment Mastectomy Bra. Charlotte Obando RN,BSN,OCN,CBCN

## 2025-05-02 PROBLEM — D12.6 ADENOMATOUS POLYP OF COLON: Status: ACTIVE | Noted: 2025-05-02

## 2025-05-05 ENCOUNTER — PATIENT OUTREACH (OUTPATIENT)
Dept: GASTROENTEROLOGY | Facility: CLINIC | Age: 60
End: 2025-05-05
Payer: COMMERCIAL